# Patient Record
Sex: MALE | Race: WHITE | Employment: OTHER | URBAN - NONMETROPOLITAN AREA
[De-identification: names, ages, dates, MRNs, and addresses within clinical notes are randomized per-mention and may not be internally consistent; named-entity substitution may affect disease eponyms.]

---

## 2017-01-01 ENCOUNTER — APPOINTMENT (OUTPATIENT)
Dept: GENERAL RADIOLOGY | Age: 82
DRG: 853 | End: 2017-01-01
Attending: SURGERY
Payer: MEDICARE

## 2017-01-01 ENCOUNTER — ANESTHESIA (OUTPATIENT)
Dept: OPERATING ROOM | Age: 82
DRG: 853 | End: 2017-01-01
Payer: MEDICARE

## 2017-01-01 ENCOUNTER — ANESTHESIA EVENT (OUTPATIENT)
Dept: OPERATING ROOM | Age: 82
DRG: 853 | End: 2017-01-01
Payer: MEDICARE

## 2017-01-01 ENCOUNTER — HOSPITAL ENCOUNTER (INPATIENT)
Age: 82
LOS: 3 days | DRG: 853 | End: 2018-01-01
Attending: SURGERY | Admitting: SURGERY
Payer: MEDICARE

## 2017-01-01 VITALS
TEMPERATURE: 95.9 F | DIASTOLIC BLOOD PRESSURE: 123 MMHG | SYSTOLIC BLOOD PRESSURE: 154 MMHG | RESPIRATION RATE: 15 BRPM | OXYGEN SATURATION: 96 %

## 2017-01-01 LAB
ABO: NORMAL
ALBUMIN SERPL-MCNC: 2 G/DL (ref 3.5–5.1)
ALBUMIN SERPL-MCNC: 2.5 G/DL (ref 3.5–5.1)
ALLEN TEST: ABNORMAL
ALLEN TEST: POSITIVE
ALLEN TEST: POSITIVE
ALP BLD-CCNC: 44 U/L (ref 38–126)
ALP BLD-CCNC: 55 U/L (ref 38–126)
ALT SERPL-CCNC: 37 U/L (ref 11–66)
ALT SERPL-CCNC: 43 U/L (ref 11–66)
AMORPHOUS: ABNORMAL
ANION GAP SERPL CALCULATED.3IONS-SCNC: 14 MEQ/L (ref 8–16)
ANION GAP SERPL CALCULATED.3IONS-SCNC: 15 MEQ/L (ref 8–16)
ANION GAP SERPL CALCULATED.3IONS-SCNC: 15 MEQ/L (ref 8–16)
ANION GAP SERPL CALCULATED.3IONS-SCNC: 16 MEQ/L (ref 8–16)
ANION GAP SERPL CALCULATED.3IONS-SCNC: 17 MEQ/L (ref 8–16)
ANION GAP SERPL CALCULATED.3IONS-SCNC: 19 MEQ/L (ref 8–16)
ANION GAP SERPL CALCULATED.3IONS-SCNC: 21 MEQ/L (ref 8–16)
ANION GAP SERPL CALCULATED.3IONS-SCNC: 23 MEQ/L (ref 8–16)
ANION GAP SERPL CALCULATED.3IONS-SCNC: 25 MEQ/L (ref 8–16)
ANION GAP SERPL CALCULATED.3IONS-SCNC: 27 MEQ/L (ref 8–16)
ANISOCYTOSIS: ABNORMAL
ANTIBODY SCREEN: NORMAL
AST SERPL-CCNC: 76 U/L (ref 5–40)
AST SERPL-CCNC: 90 U/L (ref 5–40)
BACTERIA: ABNORMAL
BANDED NEUTROPHILS ABSOLUTE COUNT: 0.3 THOU/MM3
BANDED NEUTROPHILS ABSOLUTE COUNT: 2.1 THOU/MM3
BANDED NEUTROPHILS ABSOLUTE COUNT: 2.8 THOU/MM3
BANDS PRESENT: 22 %
BANDS PRESENT: 27 %
BANDS PRESENT: 5 %
BASE EXCESS (CALCULATED): -12.1 MMOL/L (ref -2.5–2.5)
BASE EXCESS (CALCULATED): -13.3 MMOL/L (ref -2.5–2.5)
BASE EXCESS (CALCULATED): -14.5 MMOL/L (ref -2.5–2.5)
BASE EXCESS (CALCULATED): -14.5 MMOL/L (ref -2.5–2.5)
BASE EXCESS (CALCULATED): -15.2 MMOL/L (ref -2.5–2.5)
BASE EXCESS (CALCULATED): -5.3 MMOL/L (ref -2.5–2.5)
BASE EXCESS (CALCULATED): -7.5 MMOL/L (ref -2.5–2.5)
BASE EXCESS (CALCULATED): -8.1 MMOL/L (ref -2.5–2.5)
BASOPHILIA: SLIGHT
BASOPHILS # BLD: 0 %
BASOPHILS ABSOLUTE: 0 THOU/MM3 (ref 0–0.1)
BETA-HYDROXYBUTYRATE: 16.31 MG/DL (ref 0.2–2.81)
BILIRUB SERPL-MCNC: 0.3 MG/DL (ref 0.3–1.2)
BILIRUB SERPL-MCNC: 0.3 MG/DL (ref 0.3–1.2)
BILIRUBIN URINE: NEGATIVE
BLOOD, URINE: ABNORMAL
BUN BLDV-MCNC: 34 MG/DL (ref 7–22)
BUN BLDV-MCNC: 36 MG/DL (ref 7–22)
BUN BLDV-MCNC: 37 MG/DL (ref 7–22)
BUN BLDV-MCNC: 38 MG/DL (ref 7–22)
BUN BLDV-MCNC: 40 MG/DL (ref 7–22)
BUN BLDV-MCNC: 40 MG/DL (ref 7–22)
BUN BLDV-MCNC: 42 MG/DL (ref 7–22)
BUN BLDV-MCNC: 43 MG/DL (ref 7–22)
BUN BLDV-MCNC: 45 MG/DL (ref 7–22)
BUN BLDV-MCNC: 45 MG/DL (ref 7–22)
CALCIUM IONIZED SERUM: 1.17 MMOL/L (ref 1.12–1.32)
CALCIUM IONIZED SERUM: 1.21 MMOL/L (ref 1.12–1.32)
CALCIUM SERPL-MCNC: 7 MG/DL (ref 8.5–10.5)
CALCIUM SERPL-MCNC: 7.2 MG/DL (ref 8.5–10.5)
CALCIUM SERPL-MCNC: 7.3 MG/DL (ref 8.5–10.5)
CALCIUM SERPL-MCNC: 7.3 MG/DL (ref 8.5–10.5)
CALCIUM SERPL-MCNC: 7.4 MG/DL (ref 8.5–10.5)
CALCIUM SERPL-MCNC: 7.5 MG/DL (ref 8.5–10.5)
CALCIUM SERPL-MCNC: 7.7 MG/DL (ref 8.5–10.5)
CASTS: ABNORMAL /LPF
CASTS: ABNORMAL /LPF
CHARACTER, URINE: CLEAR
CHLORIDE BLD-SCNC: 91 MEQ/L (ref 98–111)
CHLORIDE BLD-SCNC: 91 MEQ/L (ref 98–111)
CHLORIDE BLD-SCNC: 93 MEQ/L (ref 98–111)
CHLORIDE BLD-SCNC: 94 MEQ/L (ref 98–111)
CHLORIDE BLD-SCNC: 94 MEQ/L (ref 98–111)
CHLORIDE BLD-SCNC: 95 MEQ/L (ref 98–111)
CHLORIDE BLD-SCNC: 95 MEQ/L (ref 98–111)
CHLORIDE BLD-SCNC: 96 MEQ/L (ref 98–111)
CHLORIDE BLD-SCNC: 97 MEQ/L (ref 98–111)
CHLORIDE BLD-SCNC: 97 MEQ/L (ref 98–111)
CO2: 12 MEQ/L (ref 23–33)
CO2: 13 MEQ/L (ref 23–33)
CO2: 13 MEQ/L (ref 23–33)
CO2: 15 MEQ/L (ref 23–33)
CO2: 17 MEQ/L (ref 23–33)
CO2: 18 MEQ/L (ref 23–33)
CO2: 18 MEQ/L (ref 23–33)
CO2: 21 MEQ/L (ref 23–33)
CO2: 22 MEQ/L (ref 23–33)
CO2: 22 MEQ/L (ref 23–33)
COLLECTED BY:: ABNORMAL
COLOR: YELLOW
CREAT SERPL-MCNC: 2.2 MG/DL (ref 0.4–1.2)
CREAT SERPL-MCNC: 2.6 MG/DL (ref 0.4–1.2)
CREAT SERPL-MCNC: 2.8 MG/DL (ref 0.4–1.2)
CREAT SERPL-MCNC: 2.8 MG/DL (ref 0.4–1.2)
CREAT SERPL-MCNC: 3.1 MG/DL (ref 0.4–1.2)
CREAT SERPL-MCNC: 3.1 MG/DL (ref 0.4–1.2)
CREAT SERPL-MCNC: 3.2 MG/DL (ref 0.4–1.2)
CREAT SERPL-MCNC: 3.3 MG/DL (ref 0.4–1.2)
CREAT SERPL-MCNC: 3.4 MG/DL (ref 0.4–1.2)
CREAT SERPL-MCNC: 3.6 MG/DL (ref 0.4–1.2)
CRENATED RBC'S: ABNORMAL
CRYSTALS: ABNORMAL
DEVICE: ABNORMAL
DIFFERENTIAL TYPE: ABNORMAL
DIFFERENTIAL, MANUAL: NORMAL
ELLIPTOCYTES: ABNORMAL
EOSINOPHIL # BLD: 0 %
EOSINOPHIL # BLD: 1 %
EOSINOPHIL # BLD: 2 %
EOSINOPHILS ABSOLUTE: 0 THOU/MM3 (ref 0–0.4)
EOSINOPHILS ABSOLUTE: 0.1 THOU/MM3 (ref 0–0.4)
EOSINOPHILS ABSOLUTE: 0.2 THOU/MM3 (ref 0–0.4)
EPITHELIAL CELLS, UA: ABNORMAL /HPF
GFR SERPL CREATININE-BSD FRML MDRD: 16 ML/MIN/1.73M2
GFR SERPL CREATININE-BSD FRML MDRD: 17 ML/MIN/1.73M2
GFR SERPL CREATININE-BSD FRML MDRD: 18 ML/MIN/1.73M2
GFR SERPL CREATININE-BSD FRML MDRD: 18 ML/MIN/1.73M2
GFR SERPL CREATININE-BSD FRML MDRD: 19 ML/MIN/1.73M2
GFR SERPL CREATININE-BSD FRML MDRD: 19 ML/MIN/1.73M2
GFR SERPL CREATININE-BSD FRML MDRD: 21 ML/MIN/1.73M2
GFR SERPL CREATININE-BSD FRML MDRD: 21 ML/MIN/1.73M2
GFR SERPL CREATININE-BSD FRML MDRD: 23 ML/MIN/1.73M2
GFR SERPL CREATININE-BSD FRML MDRD: 28 ML/MIN/1.73M2
GLUCOSE BLD-MCNC: 110 MG/DL (ref 70–108)
GLUCOSE BLD-MCNC: 125 MG/DL (ref 70–108)
GLUCOSE BLD-MCNC: 126 MG/DL (ref 70–108)
GLUCOSE BLD-MCNC: 178 MG/DL (ref 70–108)
GLUCOSE BLD-MCNC: 211 MG/DL (ref 70–108)
GLUCOSE BLD-MCNC: 323 MG/DL (ref 70–108)
GLUCOSE BLD-MCNC: 429 MG/DL (ref 70–108)
GLUCOSE BLD-MCNC: 478 MG/DL (ref 70–108)
GLUCOSE BLD-MCNC: 521 MG/DL (ref 70–108)
GLUCOSE BLD-MCNC: 78 MG/DL (ref 70–108)
GLUCOSE, URINE: NEGATIVE MG/DL
GLUCOSE, WHOLE BLOOD: 115 MG/DL (ref 70–108)
GLUCOSE, WHOLE BLOOD: 123 MG/DL (ref 70–108)
GLUCOSE, WHOLE BLOOD: 130 MG/DL (ref 70–108)
GLUCOSE, WHOLE BLOOD: 133 MG/DL (ref 70–108)
GLUCOSE, WHOLE BLOOD: 134 MG/DL (ref 70–108)
GLUCOSE, WHOLE BLOOD: 138 MG/DL (ref 70–108)
GLUCOSE, WHOLE BLOOD: 153 MG/DL (ref 70–108)
GLUCOSE, WHOLE BLOOD: 158 MG/DL (ref 70–108)
GLUCOSE, WHOLE BLOOD: 169 MG/DL (ref 70–108)
GLUCOSE, WHOLE BLOOD: 180 MG/DL (ref 70–108)
GLUCOSE, WHOLE BLOOD: 186 MG/DL (ref 70–108)
GLUCOSE, WHOLE BLOOD: 196 MG/DL (ref 70–108)
GLUCOSE, WHOLE BLOOD: 200 MG/DL (ref 70–108)
GLUCOSE, WHOLE BLOOD: 224 MG/DL (ref 70–108)
GLUCOSE, WHOLE BLOOD: 254 MG/DL (ref 70–108)
GLUCOSE, WHOLE BLOOD: 264 MG/DL (ref 70–108)
GLUCOSE, WHOLE BLOOD: 292 MG/DL (ref 70–108)
GLUCOSE, WHOLE BLOOD: 326 MG/DL (ref 70–108)
GLUCOSE, WHOLE BLOOD: 326 MG/DL (ref 70–108)
GLUCOSE, WHOLE BLOOD: 350 MG/DL (ref 70–108)
GLUCOSE, WHOLE BLOOD: 386 MG/DL (ref 70–108)
GLUCOSE, WHOLE BLOOD: 438 MG/DL (ref 70–108)
GLUCOSE, WHOLE BLOOD: 499 MG/DL (ref 70–108)
GLUCOSE, WHOLE BLOOD: 72 MG/DL (ref 70–108)
GLUCOSE, WHOLE BLOOD: 97 MG/DL (ref 70–108)
GLUCOSE, WHOLE BLOOD: 98 MG/DL (ref 70–108)
GRAM STAIN RESULT: ABNORMAL
HCO3: 12 MMOL/L (ref 23–28)
HCO3: 13 MMOL/L (ref 23–28)
HCO3: 13 MMOL/L (ref 23–28)
HCO3: 14 MMOL/L (ref 23–28)
HCO3: 14 MMOL/L (ref 23–28)
HCO3: 16 MMOL/L (ref 23–28)
HCO3: 18 MMOL/L (ref 23–28)
HCO3: 21 MMOL/L (ref 23–28)
HCT VFR BLD CALC: 26.5 % (ref 42–52)
HCT VFR BLD CALC: 27.4 % (ref 42–52)
HCT VFR BLD CALC: 37.1 % (ref 42–52)
HCT VFR BLD CALC: 40.5 % (ref 42–52)
HEMOGLOBIN FINGERSTICK, POC: 10.2 G/DL (ref 14–18)
HEMOGLOBIN: 12 GM/DL (ref 14–18)
HEMOGLOBIN: 13 GM/DL (ref 14–18)
HEMOGLOBIN: 8.5 GM/DL (ref 14–18)
HEMOGLOBIN: 8.8 GM/DL (ref 14–18)
IFIO2: 100
IFIO2: 35
IFIO2: 40
IFIO2: 50
KETONES, URINE: 15
LACTIC ACID: 10.8 MMOL/L (ref 0.5–2.2)
LACTIC ACID: 11.5 MMOL/L (ref 0.5–2.2)
LACTIC ACID: 5.3 MMOL/L (ref 0.5–2.2)
LACTIC ACID: 8 MMOL/L (ref 0.5–2.2)
LACTIC ACID: 9 MMOL/L (ref 0.5–2.2)
LEUKOCYTE EST, POC: NEGATIVE
LV EF: 58 %
LVEF MODALITY: NORMAL
LYMPHOCYTES # BLD: 11 %
LYMPHOCYTES # BLD: 11 %
LYMPHOCYTES # BLD: 22 %
LYMPHOCYTES ABSOLUTE: 0.9 THOU/MM3 (ref 1–4.8)
LYMPHOCYTES ABSOLUTE: 1.2 THOU/MM3 (ref 1–4.8)
LYMPHOCYTES ABSOLUTE: 1.4 THOU/MM3 (ref 1–4.8)
MAGNESIUM: 2.5 MG/DL (ref 1.6–2.4)
MAGNESIUM: 2.5 MG/DL (ref 1.6–2.4)
MAGNESIUM: 2.6 MG/DL (ref 1.6–2.4)
MAGNESIUM: 2.7 MG/DL (ref 1.6–2.4)
MCH RBC QN AUTO: 28.5 PG (ref 27–31)
MCH RBC QN AUTO: 28.5 PG (ref 27–31)
MCH RBC QN AUTO: 28.6 PG (ref 27–31)
MCHC RBC AUTO-ENTMCNC: 32 GM/DL (ref 33–37)
MCHC RBC AUTO-ENTMCNC: 32.2 GM/DL (ref 33–37)
MCHC RBC AUTO-ENTMCNC: 32.3 GM/DL (ref 33–37)
MCV RBC AUTO: 88.3 FL (ref 80–94)
MCV RBC AUTO: 88.7 FL (ref 80–94)
MCV RBC AUTO: 88.9 FL (ref 80–94)
METAMYELOCYTES: 1 %
METAMYELOCYTES: 3 %
METAMYELOCYTES: 4 %
MISCELLANEOUS LAB TEST RESULT: ABNORMAL
MODE: AC
MONOCYTES # BLD: 10 %
MONOCYTES # BLD: 5 %
MONOCYTES # BLD: 8 %
MONOCYTES ABSOLUTE: 0.4 THOU/MM3 (ref 0.4–1.3)
MONOCYTES ABSOLUTE: 0.4 THOU/MM3 (ref 0.4–1.3)
MONOCYTES ABSOLUTE: 1.3 THOU/MM3 (ref 0.4–1.3)
MRSA SCREEN RT-PCR: NEGATIVE
MYELOCYTES: 1 %
MYELOCYTES: 2 %
NITRITE, URINE: NEGATIVE
NUCLEATED RED BLOOD CELLS: 0 /100 WBC
O2 SATURATION: 100 %
O2 SATURATION: 91 %
O2 SATURATION: 92 %
O2 SATURATION: 96 %
O2 SATURATION: 97 %
O2 SATURATION: 99 %
ORGANISM: ABNORMAL
OVALOCYTES: ABNORMAL
PATHOLOGIST REVIEW: ABNORMAL
PCO2: 26 MMHG (ref 35–45)
PCO2: 28 MMHG (ref 35–45)
PCO2: 32 MMHG (ref 35–45)
PCO2: 33 MMHG (ref 35–45)
PCO2: 34 MMHG (ref 35–45)
PCO2: 34 MMHG (ref 35–45)
PCO2: 43 MMHG (ref 35–45)
PCO2: 47 MMHG (ref 35–45)
PDW BLD-RTO: 15.1 % (ref 11.5–14.5)
PDW BLD-RTO: 15.3 % (ref 11.5–14.5)
PDW BLD-RTO: 15.6 % (ref 11.5–14.5)
PH BLOOD GAS: 7.09 (ref 7.35–7.45)
PH BLOOD GAS: 7.19 (ref 7.35–7.45)
PH BLOOD GAS: 7.19 (ref 7.35–7.45)
PH BLOOD GAS: 7.21 (ref 7.35–7.45)
PH BLOOD GAS: 7.29 (ref 7.35–7.45)
PH BLOOD GAS: 7.29 (ref 7.35–7.45)
PH BLOOD GAS: 7.33 (ref 7.35–7.45)
PH BLOOD GAS: 7.39 (ref 7.35–7.45)
PH UA: 5
PHOSPHORUS: 4.2 MG/DL (ref 2.4–4.7)
PHOSPHORUS: 4.2 MG/DL (ref 2.4–4.7)
PHOSPHORUS: 4.4 MG/DL (ref 2.4–4.7)
PHOSPHORUS: 4.4 MG/DL (ref 2.4–4.7)
PHOSPHORUS: 4.5 MG/DL (ref 2.4–4.7)
PHOSPHORUS: 4.5 MG/DL (ref 2.4–4.7)
PHOSPHORUS: 4.6 MG/DL (ref 2.4–4.7)
PHOSPHORUS: 4.8 MG/DL (ref 2.4–4.7)
PHOSPHORUS: 4.8 MG/DL (ref 2.4–4.7)
PLATELET # BLD: 205 THOU/MM3 (ref 130–400)
PLATELET # BLD: 310 THOU/MM3 (ref 130–400)
PLATELET # BLD: 434 THOU/MM3 (ref 130–400)
PLATELET ESTIMATE: ABNORMAL
PLATELET ESTIMATE: ADEQUATE
PLATELET ESTIMATE: ADEQUATE
PMV BLD AUTO: 7.5 MCM (ref 7.4–10.4)
PMV BLD AUTO: 7.7 MCM (ref 7.4–10.4)
PMV BLD AUTO: 7.9 MCM (ref 7.4–10.4)
PO2: 151 MMHG (ref 71–104)
PO2: 495 MMHG (ref 71–104)
PO2: 501 MMHG (ref 71–104)
PO2: 502 MMHG (ref 71–104)
PO2: 67 MMHG (ref 71–104)
PO2: 67 MMHG (ref 71–104)
PO2: 89 MMHG (ref 71–104)
PO2: 91 MMHG (ref 71–104)
POIKILOCYTES: ABNORMAL
POIKILOCYTES: ABNORMAL
POTASSIUM SERPL-SCNC: 4.4 MEQ/L (ref 3.5–5.2)
POTASSIUM SERPL-SCNC: 4.7 MEQ/L (ref 3.5–5.2)
POTASSIUM SERPL-SCNC: 4.7 MEQ/L (ref 3.5–5.2)
POTASSIUM SERPL-SCNC: 4.8 MEQ/L (ref 3.5–5.2)
POTASSIUM SERPL-SCNC: 4.9 MEQ/L (ref 3.5–5.2)
POTASSIUM SERPL-SCNC: 5.2 MEQ/L (ref 3.5–5.2)
POTASSIUM SERPL-SCNC: 5.4 MEQ/L (ref 3.5–5.2)
POTASSIUM SERPL-SCNC: 5.6 MEQ/L (ref 3.5–5.2)
POTASSIUM SERPL-SCNC: 5.6 MEQ/L (ref 3.5–5.2)
POTASSIUM SERPL-SCNC: 6.2 MEQ/L (ref 3.5–5.2)
POTASSIUM, WHOLE BLOOD: 4 MEQ/L (ref 3.5–4.9)
POTASSIUM, WHOLE BLOOD: 4.7 MEQ/L (ref 3.5–4.9)
PROCALCITONIN: 39.61 NG/ML (ref 0.01–0.09)
PROCALCITONIN: > 100 NG/ML (ref 0.01–0.09)
PROTEIN UA: 100 MG/DL
RBC # BLD: 2.98 MILL/MM3 (ref 4.7–6.1)
RBC # BLD: 4.2 MILL/MM3 (ref 4.7–6.1)
RBC # BLD: 4.56 MILL/MM3 (ref 4.7–6.1)
RBC URINE: ABNORMAL /HPF
RENAL EPITHELIAL, UA: ABNORMAL
RESPIRATORY CULTURE: ABNORMAL
RESPIRATORY CULTURE: ABNORMAL
RH FACTOR: NORMAL
SCHISTOCYTES: ABNORMAL
SCHISTOCYTES: ABNORMAL
SEG NEUTROPHILS: 50 %
SEG NEUTROPHILS: 51 %
SEG NEUTROPHILS: 64 %
SEGMENTED NEUTROPHILS ABSOLUTE COUNT: 3.5 THOU/MM3 (ref 1.8–7.7)
SEGMENTED NEUTROPHILS ABSOLUTE COUNT: 3.9 THOU/MM3 (ref 1.8–7.7)
SEGMENTED NEUTROPHILS ABSOLUTE COUNT: 6.5 THOU/MM3 (ref 1.8–7.7)
SET PEEP: 5 MMHG
SET RESPIRATORY RATE: 14 BPM
SET RESPIRATORY RATE: 16 BPM
SET TIDAL VOLUME: 400 ML
SET TIDAL VOLUME: 480 ML
SET TIDAL VOLUME: 550 ML
SODIUM BLD-SCNC: 128 MEQ/L (ref 135–145)
SODIUM BLD-SCNC: 128 MEQ/L (ref 135–145)
SODIUM BLD-SCNC: 129 MEQ/L (ref 135–145)
SODIUM BLD-SCNC: 130 MEQ/L (ref 135–145)
SODIUM BLD-SCNC: 130 MEQ/L (ref 135–145)
SODIUM BLD-SCNC: 131 MEQ/L (ref 135–145)
SODIUM BLD-SCNC: 132 MEQ/L (ref 135–145)
SODIUM BLD-SCNC: 132 MEQ/L (ref 135–145)
SODIUM BLD-SCNC: 133 MEQ/L (ref 135–145)
SODIUM BLD-SCNC: 133 MEQ/L (ref 135–145)
SODIUM, WHOLE BLOOD: 135 MEQ/L (ref 138–146)
SODIUM, WHOLE BLOOD: 139 MEQ/L (ref 138–146)
SOURCE, BLOOD GAS: ABNORMAL
SPECIFIC GRAVITY UA: >= 1.03 (ref 1–1.03)
TOTAL PROTEIN: 4 G/DL (ref 6.1–8)
TOTAL PROTEIN: 4.5 G/DL (ref 6.1–8)
TOXIC GRANULATION: SLIGHT
TROPONIN T: 0.06 NG/ML
TROPONIN T: 0.07 NG/ML
TSH SERPL DL<=0.05 MIU/L-ACNC: 2.04 UIU/ML (ref 0.4–4.2)
UROBILINOGEN, URINE: 0.2 EU/DL
WBC # BLD: 12.7 THOU/MM3 (ref 4.8–10.8)
WBC # BLD: 5.4 THOU/MM3 (ref 4.8–10.8)
WBC # BLD: 7.8 THOU/MM3 (ref 4.8–10.8)
WBC UA: ABNORMAL /HPF
YEAST: ABNORMAL

## 2017-01-01 PROCEDURE — 87040 BLOOD CULTURE FOR BACTERIA: CPT

## 2017-01-01 PROCEDURE — 2580000003 HC RX 258: Performed by: ANESTHESIOLOGY

## 2017-01-01 PROCEDURE — 2580000003 HC RX 258: Performed by: SURGERY

## 2017-01-01 PROCEDURE — 2720000010 HC SURG SUPPLY STERILE: Performed by: SURGERY

## 2017-01-01 PROCEDURE — 88307 TISSUE EXAM BY PATHOLOGIST: CPT

## 2017-01-01 PROCEDURE — 2580000003 HC RX 258: Performed by: PHARMACIST

## 2017-01-01 PROCEDURE — 44146 PARTIAL REMOVAL OF COLON: CPT | Performed by: SURGERY

## 2017-01-01 PROCEDURE — 2500000003 HC RX 250 WO HCPCS: Performed by: INTERNAL MEDICINE

## 2017-01-01 PROCEDURE — 6360000002 HC RX W HCPCS: Performed by: SURGERY

## 2017-01-01 PROCEDURE — 82947 ASSAY GLUCOSE BLOOD QUANT: CPT

## 2017-01-01 PROCEDURE — 94003 VENT MGMT INPAT SUBQ DAY: CPT

## 2017-01-01 PROCEDURE — 81003 URINALYSIS AUTO W/O SCOPE: CPT

## 2017-01-01 PROCEDURE — 36600 WITHDRAWAL OF ARTERIAL BLOOD: CPT

## 2017-01-01 PROCEDURE — S0028 INJECTION, FAMOTIDINE, 20 MG: HCPCS | Performed by: SURGERY

## 2017-01-01 PROCEDURE — 87070 CULTURE OTHR SPECIMN AEROBIC: CPT

## 2017-01-01 PROCEDURE — 3700000000 HC ANESTHESIA ATTENDED CARE: Performed by: SURGERY

## 2017-01-01 PROCEDURE — 80050 GENERAL HEALTH PANEL: CPT

## 2017-01-01 PROCEDURE — 93307 TTE W/O DOPPLER COMPLETE: CPT

## 2017-01-01 PROCEDURE — 6360000002 HC RX W HCPCS: Performed by: INTERNAL MEDICINE

## 2017-01-01 PROCEDURE — P9045 ALBUMIN (HUMAN), 5%, 250 ML: HCPCS | Performed by: INTERNAL MEDICINE

## 2017-01-01 PROCEDURE — 85025 COMPLETE CBC W/AUTO DIFF WBC: CPT

## 2017-01-01 PROCEDURE — 86850 RBC ANTIBODY SCREEN: CPT

## 2017-01-01 PROCEDURE — 2500000003 HC RX 250 WO HCPCS: Performed by: ANESTHESIOLOGY

## 2017-01-01 PROCEDURE — 80048 BASIC METABOLIC PNL TOTAL CA: CPT

## 2017-01-01 PROCEDURE — 99024 POSTOP FOLLOW-UP VISIT: CPT | Performed by: SURGERY

## 2017-01-01 PROCEDURE — 85018 HEMOGLOBIN: CPT

## 2017-01-01 PROCEDURE — 82010 KETONE BODYS QUAN: CPT

## 2017-01-01 PROCEDURE — 86901 BLOOD TYPING SEROLOGIC RH(D): CPT

## 2017-01-01 PROCEDURE — 83735 ASSAY OF MAGNESIUM: CPT

## 2017-01-01 PROCEDURE — 2500000003 HC RX 250 WO HCPCS: Performed by: SURGERY

## 2017-01-01 PROCEDURE — 83605 ASSAY OF LACTIC ACID: CPT

## 2017-01-01 PROCEDURE — 2580000003 HC RX 258: Performed by: INTERNAL MEDICINE

## 2017-01-01 PROCEDURE — A4415 OST SKN BARR W/O CONV >4 SQI: HCPCS | Performed by: SURGERY

## 2017-01-01 PROCEDURE — 82330 ASSAY OF CALCIUM: CPT

## 2017-01-01 PROCEDURE — 6360000002 HC RX W HCPCS: Performed by: ANESTHESIOLOGY

## 2017-01-01 PROCEDURE — 37799 UNLISTED PX VASCULAR SURGERY: CPT

## 2017-01-01 PROCEDURE — 84145 PROCALCITONIN (PCT): CPT

## 2017-01-01 PROCEDURE — 6360000002 HC RX W HCPCS: Performed by: PHARMACIST

## 2017-01-01 PROCEDURE — 0DTN0ZZ RESECTION OF SIGMOID COLON, OPEN APPROACH: ICD-10-PCS | Performed by: SURGERY

## 2017-01-01 PROCEDURE — 6370000000 HC RX 637 (ALT 250 FOR IP): Performed by: INTERNAL MEDICINE

## 2017-01-01 PROCEDURE — A4649 SURGICAL SUPPLIES: HCPCS | Performed by: SURGERY

## 2017-01-01 PROCEDURE — 71010 XR CHEST PORTABLE: CPT

## 2017-01-01 PROCEDURE — 82803 BLOOD GASES ANY COMBINATION: CPT

## 2017-01-01 PROCEDURE — 36415 COLL VENOUS BLD VENIPUNCTURE: CPT

## 2017-01-01 PROCEDURE — C1751 CATH, INF, PER/CENT/MIDLINE: HCPCS | Performed by: SURGERY

## 2017-01-01 PROCEDURE — A6446 CONFORM BAND S W>=3" <5"/YD: HCPCS | Performed by: SURGERY

## 2017-01-01 PROCEDURE — 94002 VENT MGMT INPAT INIT DAY: CPT

## 2017-01-01 PROCEDURE — 36430 TRANSFUSION BLD/BLD COMPNT: CPT

## 2017-01-01 PROCEDURE — A4409 OST SKN BARR CONVEX <=4 SQ I: HCPCS | Performed by: SURGERY

## 2017-01-01 PROCEDURE — 81001 URINALYSIS AUTO W/SCOPE: CPT

## 2017-01-01 PROCEDURE — 2700000000 HC OXYGEN THERAPY PER DAY

## 2017-01-01 PROCEDURE — 3600000004 HC SURGERY LEVEL 4 BASE: Performed by: SURGERY

## 2017-01-01 PROCEDURE — 3600000014 HC SURGERY LEVEL 4 ADDTL 15MIN: Performed by: SURGERY

## 2017-01-01 PROCEDURE — 87641 MR-STAPH DNA AMP PROBE: CPT

## 2017-01-01 PROCEDURE — 51798 US URINE CAPACITY MEASURE: CPT

## 2017-01-01 PROCEDURE — 85014 HEMATOCRIT: CPT

## 2017-01-01 PROCEDURE — 86923 COMPATIBILITY TEST ELECTRIC: CPT

## 2017-01-01 PROCEDURE — 87205 SMEAR GRAM STAIN: CPT

## 2017-01-01 PROCEDURE — A4388 DRAINABLE PCH W EX WEAR BARR: HCPCS | Performed by: SURGERY

## 2017-01-01 PROCEDURE — 0D1M0Z4 BYPASS DESCENDING COLON TO CUTANEOUS, OPEN APPROACH: ICD-10-PCS | Performed by: ORTHOPAEDIC SURGERY

## 2017-01-01 PROCEDURE — 2780000010 HC IMPLANT OTHER: Performed by: SURGERY

## 2017-01-01 PROCEDURE — P9046 ALBUMIN (HUMAN), 25%, 20 ML: HCPCS | Performed by: INTERNAL MEDICINE

## 2017-01-01 PROCEDURE — 2500000003 HC RX 250 WO HCPCS

## 2017-01-01 PROCEDURE — A5063 DRAIN OSTOMY POUCH W/FLANGE: HCPCS | Performed by: SURGERY

## 2017-01-01 PROCEDURE — 2000000000 HC ICU R&B

## 2017-01-01 PROCEDURE — 84484 ASSAY OF TROPONIN QUANT: CPT

## 2017-01-01 PROCEDURE — 84100 ASSAY OF PHOSPHORUS: CPT

## 2017-01-01 PROCEDURE — 84132 ASSAY OF SERUM POTASSIUM: CPT

## 2017-01-01 PROCEDURE — 36592 COLLECT BLOOD FROM PICC: CPT

## 2017-01-01 PROCEDURE — 80053 COMPREHEN METABOLIC PANEL: CPT

## 2017-01-01 PROCEDURE — A4421 OSTOMY SUPPLY MISC: HCPCS | Performed by: SURGERY

## 2017-01-01 PROCEDURE — 84295 ASSAY OF SERUM SODIUM: CPT

## 2017-01-01 PROCEDURE — 75984 XRAY CONTROL CATHETER CHANGE: CPT

## 2017-01-01 PROCEDURE — 06H033Z INSERTION OF INFUSION DEVICE INTO INFERIOR VENA CAVA, PERCUTANEOUS APPROACH: ICD-10-PCS | Performed by: INTERNAL MEDICINE

## 2017-01-01 PROCEDURE — P9016 RBC LEUKOCYTES REDUCED: HCPCS

## 2017-01-01 PROCEDURE — 3700000001 HC ADD 15 MINUTES (ANESTHESIA): Performed by: SURGERY

## 2017-01-01 PROCEDURE — 87081 CULTURE SCREEN ONLY: CPT

## 2017-01-01 PROCEDURE — 74000 XR ABDOMEN LIMITED (KUB): CPT

## 2017-01-01 PROCEDURE — 86900 BLOOD TYPING SEROLOGIC ABO: CPT

## 2017-01-01 PROCEDURE — 87086 URINE CULTURE/COLONY COUNT: CPT

## 2017-01-01 PROCEDURE — 99223 1ST HOSP IP/OBS HIGH 75: CPT | Performed by: SURGERY

## 2017-01-01 DEVICE — RELOAD STPL L75MM OPN H3.8MM CLS 1.5MM WIRE DIA0.2MM REG: Type: IMPLANTABLE DEVICE | Status: FUNCTIONAL

## 2017-01-01 RX ORDER — MORPHINE SULFATE 4 MG/ML
4 INJECTION, SOLUTION INTRAMUSCULAR; INTRAVENOUS
Status: DISCONTINUED | OUTPATIENT
Start: 2017-01-01 | End: 2017-01-01

## 2017-01-01 RX ORDER — ONDANSETRON 2 MG/ML
4 INJECTION INTRAMUSCULAR; INTRAVENOUS EVERY 6 HOURS PRN
Status: DISCONTINUED | OUTPATIENT
Start: 2017-01-01 | End: 2017-01-01

## 2017-01-01 RX ORDER — SODIUM CHLORIDE 9 MG/ML
INJECTION, SOLUTION INTRAVENOUS CONTINUOUS
Status: DISCONTINUED | OUTPATIENT
Start: 2017-01-01 | End: 2017-01-01

## 2017-01-01 RX ORDER — NICOTINE POLACRILEX 4 MG
15 LOZENGE BUCCAL PRN
Status: DISCONTINUED | OUTPATIENT
Start: 2017-01-01 | End: 2018-01-01 | Stop reason: HOSPADM

## 2017-01-01 RX ORDER — ASPIRIN 81 MG/1
81 TABLET, CHEWABLE ORAL DAILY
COMMUNITY

## 2017-01-01 RX ORDER — SODIUM CHLORIDE 9 MG/ML
INJECTION, SOLUTION INTRAVENOUS CONTINUOUS
Status: DISCONTINUED | OUTPATIENT
Start: 2017-01-01 | End: 2018-01-01 | Stop reason: HOSPADM

## 2017-01-01 RX ORDER — EPINEPHRINE 1 MG/ML
INJECTION, SOLUTION, CONCENTRATE INTRAVENOUS PRN
Status: DISCONTINUED | OUTPATIENT
Start: 2017-01-01 | End: 2017-01-01 | Stop reason: SDUPTHER

## 2017-01-01 RX ORDER — TAMSULOSIN HYDROCHLORIDE 0.4 MG/1
0.4 CAPSULE ORAL DAILY
Status: DISCONTINUED | OUTPATIENT
Start: 2017-01-01 | End: 2017-01-01

## 2017-01-01 RX ORDER — LISINOPRIL 20 MG/1
20 TABLET ORAL DAILY
COMMUNITY

## 2017-01-01 RX ORDER — LISINOPRIL 20 MG/1
20 TABLET ORAL DAILY
Status: DISCONTINUED | OUTPATIENT
Start: 2017-01-01 | End: 2017-01-01

## 2017-01-01 RX ORDER — ALBUMIN (HUMAN) 12.5 G/50ML
50 SOLUTION INTRAVENOUS ONCE
Status: DISCONTINUED | OUTPATIENT
Start: 2017-01-01 | End: 2017-01-01 | Stop reason: SDUPTHER

## 2017-01-01 RX ORDER — DEXTROSE MONOHYDRATE 50 MG/ML
100 INJECTION, SOLUTION INTRAVENOUS PRN
Status: DISCONTINUED | OUTPATIENT
Start: 2017-01-01 | End: 2018-01-01 | Stop reason: HOSPADM

## 2017-01-01 RX ORDER — FLUCONAZOLE 2 MG/ML
200 INJECTION, SOLUTION INTRAVENOUS EVERY 24 HOURS
Status: DISCONTINUED | OUTPATIENT
Start: 2017-01-01 | End: 2018-01-01 | Stop reason: HOSPADM

## 2017-01-01 RX ORDER — CITALOPRAM 20 MG/1
20 TABLET ORAL DAILY
COMMUNITY

## 2017-01-01 RX ORDER — SODIUM CHLORIDE, SODIUM LACTATE, POTASSIUM CHLORIDE, CALCIUM CHLORIDE 600; 310; 30; 20 MG/100ML; MG/100ML; MG/100ML; MG/100ML
INJECTION, SOLUTION INTRAVENOUS CONTINUOUS
Status: DISCONTINUED | OUTPATIENT
Start: 2017-01-01 | End: 2017-01-01

## 2017-01-01 RX ORDER — SODIUM CHLORIDE 0.9 % (FLUSH) 0.9 %
10 SYRINGE (ML) INJECTION PRN
Status: DISCONTINUED | OUTPATIENT
Start: 2017-01-01 | End: 2018-01-01 | Stop reason: HOSPADM

## 2017-01-01 RX ORDER — DEXTROSE MONOHYDRATE 25 G/50ML
12.5 INJECTION, SOLUTION INTRAVENOUS PRN
Status: DISCONTINUED | OUTPATIENT
Start: 2017-01-01 | End: 2017-01-01 | Stop reason: SDUPTHER

## 2017-01-01 RX ORDER — DEXTROSE AND SODIUM CHLORIDE 5; .45 G/100ML; G/100ML
INJECTION, SOLUTION INTRAVENOUS CONTINUOUS PRN
Status: DISCONTINUED | OUTPATIENT
Start: 2017-01-01 | End: 2018-01-01 | Stop reason: HOSPADM

## 2017-01-01 RX ORDER — FLUCONAZOLE 2 MG/ML
400 INJECTION, SOLUTION INTRAVENOUS ONCE
Status: COMPLETED | OUTPATIENT
Start: 2017-01-01 | End: 2017-01-01

## 2017-01-01 RX ORDER — AMLODIPINE BESYLATE 10 MG/1
10 TABLET ORAL DAILY
Status: DISCONTINUED | OUTPATIENT
Start: 2017-01-01 | End: 2017-01-01

## 2017-01-01 RX ORDER — POTASSIUM CHLORIDE 7.45 MG/ML
10 INJECTION INTRAVENOUS PRN
Status: DISCONTINUED | OUTPATIENT
Start: 2017-01-01 | End: 2018-01-01 | Stop reason: HOSPADM

## 2017-01-01 RX ORDER — OXYBUTYNIN CHLORIDE 15 MG/1
15 TABLET, EXTENDED RELEASE ORAL DAILY
COMMUNITY

## 2017-01-01 RX ORDER — ATORVASTATIN CALCIUM 40 MG/1
40 TABLET, FILM COATED ORAL NIGHTLY
Status: DISCONTINUED | OUTPATIENT
Start: 2017-01-01 | End: 2017-01-01

## 2017-01-01 RX ORDER — ASPIRIN 81 MG/1
81 TABLET, CHEWABLE ORAL DAILY
Status: DISCONTINUED | OUTPATIENT
Start: 2017-01-01 | End: 2017-01-01

## 2017-01-01 RX ORDER — ONDANSETRON 2 MG/ML
4 INJECTION INTRAMUSCULAR; INTRAVENOUS EVERY 6 HOURS PRN
Status: DISCONTINUED | OUTPATIENT
Start: 2017-01-01 | End: 2018-01-01 | Stop reason: HOSPADM

## 2017-01-01 RX ORDER — ALBUMIN (HUMAN) 12.5 G/50ML
25 SOLUTION INTRAVENOUS
Status: COMPLETED | OUTPATIENT
Start: 2017-01-01 | End: 2017-01-01

## 2017-01-01 RX ORDER — ROCURONIUM BROMIDE 10 MG/ML
INJECTION, SOLUTION INTRAVENOUS PRN
Status: DISCONTINUED | OUTPATIENT
Start: 2017-01-01 | End: 2017-01-01 | Stop reason: SDUPTHER

## 2017-01-01 RX ORDER — SODIUM CHLORIDE 0.9 % (FLUSH) 0.9 %
10 SYRINGE (ML) INJECTION EVERY 12 HOURS SCHEDULED
Status: DISCONTINUED | OUTPATIENT
Start: 2017-01-01 | End: 2017-01-01

## 2017-01-01 RX ORDER — SODIUM CHLORIDE, SODIUM LACTATE, POTASSIUM CHLORIDE, CALCIUM CHLORIDE 600; 310; 30; 20 MG/100ML; MG/100ML; MG/100ML; MG/100ML
INJECTION, SOLUTION INTRAVENOUS ONCE
Status: COMPLETED | OUTPATIENT
Start: 2017-01-01 | End: 2017-01-01

## 2017-01-01 RX ORDER — GABAPENTIN 100 MG/1
100 CAPSULE ORAL 3 TIMES DAILY
COMMUNITY

## 2017-01-01 RX ORDER — CLOPIDOGREL BISULFATE 75 MG/1
75 TABLET ORAL DAILY
COMMUNITY

## 2017-01-01 RX ORDER — MORPHINE SULFATE 2 MG/ML
2 INJECTION, SOLUTION INTRAMUSCULAR; INTRAVENOUS
Status: DISPENSED | OUTPATIENT
Start: 2017-01-01 | End: 2017-01-01

## 2017-01-01 RX ORDER — ACETAMINOPHEN 325 MG/1
650 TABLET ORAL EVERY 4 HOURS PRN
Status: DISCONTINUED | OUTPATIENT
Start: 2017-01-01 | End: 2017-01-01

## 2017-01-01 RX ORDER — 0.9 % SODIUM CHLORIDE 0.9 %
250 INTRAVENOUS SOLUTION INTRAVENOUS ONCE
Status: COMPLETED | OUTPATIENT
Start: 2017-01-01 | End: 2017-01-01

## 2017-01-01 RX ORDER — INSULIN GLARGINE 100 [IU]/ML
40 INJECTION, SOLUTION SUBCUTANEOUS NIGHTLY
COMMUNITY

## 2017-01-01 RX ORDER — PROPOFOL 10 MG/ML
10 INJECTION, EMULSION INTRAVENOUS
Status: DISCONTINUED | OUTPATIENT
Start: 2017-01-01 | End: 2018-01-01 | Stop reason: HOSPADM

## 2017-01-01 RX ORDER — SODIUM CHLORIDE 9 MG/ML
INJECTION, SOLUTION INTRAVENOUS CONTINUOUS PRN
Status: DISCONTINUED | OUTPATIENT
Start: 2017-01-01 | End: 2017-01-01 | Stop reason: SDUPTHER

## 2017-01-01 RX ORDER — HEPARIN SODIUM 5000 [USP'U]/ML
5000 INJECTION, SOLUTION INTRAVENOUS; SUBCUTANEOUS EVERY 8 HOURS SCHEDULED
Status: DISCONTINUED | OUTPATIENT
Start: 2017-01-01 | End: 2017-01-01

## 2017-01-01 RX ORDER — ACETAMINOPHEN 650 MG/1
650 SUPPOSITORY RECTAL EVERY 4 HOURS PRN
Status: DISCONTINUED | OUTPATIENT
Start: 2017-01-01 | End: 2018-01-01 | Stop reason: HOSPADM

## 2017-01-01 RX ORDER — MORPHINE SULFATE 2 MG/ML
4 INJECTION, SOLUTION INTRAMUSCULAR; INTRAVENOUS
Status: DISPENSED | OUTPATIENT
Start: 2017-01-01 | End: 2017-01-01

## 2017-01-01 RX ORDER — HEPARIN SODIUM 5000 [USP'U]/ML
5000 INJECTION, SOLUTION INTRAVENOUS; SUBCUTANEOUS EVERY 8 HOURS SCHEDULED
Status: DISCONTINUED | OUTPATIENT
Start: 2017-01-01 | End: 2018-01-01 | Stop reason: HOSPADM

## 2017-01-01 RX ORDER — DEXTROSE MONOHYDRATE 25 G/50ML
12.5 INJECTION, SOLUTION INTRAVENOUS PRN
Status: DISCONTINUED | OUTPATIENT
Start: 2017-01-01 | End: 2018-01-01 | Stop reason: HOSPADM

## 2017-01-01 RX ORDER — METOPROLOL SUCCINATE 25 MG/1
25 TABLET, EXTENDED RELEASE ORAL DAILY
Status: DISCONTINUED | OUTPATIENT
Start: 2017-01-01 | End: 2017-01-01

## 2017-01-01 RX ORDER — METOPROLOL SUCCINATE 25 MG/1
25 TABLET, EXTENDED RELEASE ORAL DAILY
COMMUNITY

## 2017-01-01 RX ORDER — MORPHINE SULFATE 2 MG/ML
2 INJECTION, SOLUTION INTRAMUSCULAR; INTRAVENOUS
Status: DISCONTINUED | OUTPATIENT
Start: 2017-01-01 | End: 2017-01-01

## 2017-01-01 RX ORDER — M-VIT,TX,IRON,MINS/CALC/FOLIC 27MG-0.4MG
1 TABLET ORAL DAILY
COMMUNITY

## 2017-01-01 RX ORDER — SODIUM CHLORIDE 0.9 % (FLUSH) 0.9 %
10 SYRINGE (ML) INJECTION EVERY 12 HOURS SCHEDULED
Status: DISCONTINUED | OUTPATIENT
Start: 2017-01-01 | End: 2018-01-01 | Stop reason: HOSPADM

## 2017-01-01 RX ORDER — AMLODIPINE BESYLATE 10 MG/1
10 TABLET ORAL DAILY
COMMUNITY

## 2017-01-01 RX ORDER — DEXTROSE AND SODIUM CHLORIDE 5; .9 G/100ML; G/100ML
INJECTION, SOLUTION INTRAVENOUS CONTINUOUS
Status: DISCONTINUED | OUTPATIENT
Start: 2017-01-01 | End: 2018-01-01 | Stop reason: HOSPADM

## 2017-01-01 RX ORDER — SODIUM CHLORIDE 0.9 % (FLUSH) 0.9 %
10 SYRINGE (ML) INJECTION PRN
Status: DISCONTINUED | OUTPATIENT
Start: 2017-01-01 | End: 2017-01-01

## 2017-01-01 RX ORDER — ALBUMIN, HUMAN INJ 5% 5 %
12.5 SOLUTION INTRAVENOUS
Status: COMPLETED | OUTPATIENT
Start: 2017-01-01 | End: 2017-01-01

## 2017-01-01 RX ORDER — ALBUMIN (HUMAN) 12.5 G/50ML
50 SOLUTION INTRAVENOUS ONCE
Status: DISCONTINUED | OUTPATIENT
Start: 2017-01-01 | End: 2017-01-01

## 2017-01-01 RX ORDER — ATORVASTATIN CALCIUM 40 MG/1
40 TABLET, FILM COATED ORAL NIGHTLY
COMMUNITY

## 2017-01-01 RX ORDER — TAMSULOSIN HYDROCHLORIDE 0.4 MG/1
0.4 CAPSULE ORAL DAILY
COMMUNITY

## 2017-01-01 RX ORDER — FENTANYL CITRATE 50 UG/ML
25 INJECTION, SOLUTION INTRAMUSCULAR; INTRAVENOUS
Status: DISCONTINUED | OUTPATIENT
Start: 2017-01-01 | End: 2018-01-01 | Stop reason: HOSPADM

## 2017-01-01 RX ADMIN — Medication 80 MCG/MIN: at 15:06

## 2017-01-01 RX ADMIN — ALBUMIN (HUMAN) 25 G: 0.25 INJECTION, SOLUTION INTRAVENOUS at 12:55

## 2017-01-01 RX ADMIN — Medication 50 MG: at 22:20

## 2017-01-01 RX ADMIN — SODIUM CHLORIDE, POTASSIUM CHLORIDE, SODIUM LACTATE AND CALCIUM CHLORIDE: 600; 310; 30; 20 INJECTION, SOLUTION INTRAVENOUS at 14:43

## 2017-01-01 RX ADMIN — DEXTROSE AND SODIUM CHLORIDE: 5; 900 INJECTION, SOLUTION INTRAVENOUS at 11:54

## 2017-01-01 RX ADMIN — Medication 6.5 UNITS/HR: at 17:42

## 2017-01-01 RX ADMIN — VASOPRESSIN 0.02 UNITS/MIN: 20 INJECTION INTRAVENOUS at 06:30

## 2017-01-01 RX ADMIN — HYDROCORTISONE SODIUM SUCCINATE 100 MG: 100 INJECTION, POWDER, FOR SOLUTION INTRAMUSCULAR; INTRAVENOUS at 16:16

## 2017-01-01 RX ADMIN — FLUCONAZOLE 400 MG: 2 INJECTION INTRAVENOUS at 16:18

## 2017-01-01 RX ADMIN — PHENYLEPHRINE HYDROCHLORIDE 200 MCG: 10 INJECTION INTRAMUSCULAR; INTRAVENOUS; SUBCUTANEOUS at 21:49

## 2017-01-01 RX ADMIN — ALBUMIN (HUMAN) 12.5 G: 12.5 INJECTION, SOLUTION INTRAVENOUS at 18:18

## 2017-01-01 RX ADMIN — MORPHINE SULFATE 4 MG: 2 INJECTION, SOLUTION INTRAMUSCULAR; INTRAVENOUS at 04:54

## 2017-01-01 RX ADMIN — PHENYLEPHRINE HYDROCHLORIDE 200 MCG: 10 INJECTION INTRAMUSCULAR; INTRAVENOUS; SUBCUTANEOUS at 21:32

## 2017-01-01 RX ADMIN — Medication 12 UNITS: at 13:57

## 2017-01-01 RX ADMIN — EPINEPHRINE 1 MG: 1 INJECTION, SOLUTION, CONCENTRATE INTRAVENOUS at 21:21

## 2017-01-01 RX ADMIN — MEROPENEM 1 G: 1 INJECTION, POWDER, FOR SOLUTION INTRAVENOUS at 21:32

## 2017-01-01 RX ADMIN — EPINEPHRINE 3 MCG/MIN: 1 INJECTION PARENTERAL at 12:02

## 2017-01-01 RX ADMIN — Medication 50 MEQ: at 22:24

## 2017-01-01 RX ADMIN — FAMOTIDINE 20 MG: 10 INJECTION, SOLUTION INTRAVENOUS at 09:49

## 2017-01-01 RX ADMIN — CEFOXITIN 2 G: 2 INJECTION, POWDER, FOR SOLUTION INTRAVENOUS at 21:37

## 2017-01-01 RX ADMIN — Medication 25 MCG/HR: at 12:56

## 2017-01-01 RX ADMIN — Medication 10 ML: at 09:43

## 2017-01-01 RX ADMIN — Medication 10 ML: at 09:50

## 2017-01-01 RX ADMIN — VASOPRESSIN 0.02 UNITS/MIN: 20 INJECTION INTRAVENOUS at 17:50

## 2017-01-01 RX ADMIN — SODIUM CHLORIDE, POTASSIUM CHLORIDE, SODIUM LACTATE AND CALCIUM CHLORIDE: 600; 310; 30; 20 INJECTION, SOLUTION INTRAVENOUS at 10:30

## 2017-01-01 RX ADMIN — Medication 50 MCG/MIN: at 12:39

## 2017-01-01 RX ADMIN — DEXTROSE AND SODIUM CHLORIDE: 5; 450 INJECTION, SOLUTION INTRAVENOUS at 00:12

## 2017-01-01 RX ADMIN — Medication 30 MCG/MIN: at 19:46

## 2017-01-01 RX ADMIN — Medication 50 MEQ: at 22:16

## 2017-01-01 RX ADMIN — FAMOTIDINE 20 MG: 10 INJECTION, SOLUTION INTRAVENOUS at 09:43

## 2017-01-01 RX ADMIN — EPINEPHRINE 2 MCG/MIN: 1 INJECTION PARENTERAL at 13:40

## 2017-01-01 RX ADMIN — SODIUM CHLORIDE: 900 INJECTION INTRAVENOUS at 16:15

## 2017-01-01 RX ADMIN — EPINEPHRINE 1 MG: 1 INJECTION, SOLUTION, CONCENTRATE INTRAVENOUS at 21:26

## 2017-01-01 RX ADMIN — MEROPENEM 1 G: 1 INJECTION, POWDER, FOR SOLUTION INTRAVENOUS at 02:21

## 2017-01-01 RX ADMIN — ALBUMIN (HUMAN) 12.5 G: 12.5 INJECTION, SOLUTION INTRAVENOUS at 17:10

## 2017-01-01 RX ADMIN — ALBUMIN (HUMAN) 12.5 G: 12.5 INJECTION, SOLUTION INTRAVENOUS at 16:15

## 2017-01-01 RX ADMIN — SODIUM CHLORIDE: 9 INJECTION, SOLUTION INTRAVENOUS at 20:35

## 2017-01-01 RX ADMIN — SODIUM BICARBONATE 150 MEQ: 84 INJECTION, SOLUTION INTRAVENOUS at 08:22

## 2017-01-01 RX ADMIN — ALBUMIN (HUMAN) 25 G: 0.25 INJECTION, SOLUTION INTRAVENOUS at 13:31

## 2017-01-01 RX ADMIN — Medication 112.5 MCG/MIN: at 09:53

## 2017-01-01 RX ADMIN — SODIUM CHLORIDE, POTASSIUM CHLORIDE, SODIUM LACTATE AND CALCIUM CHLORIDE: 600; 310; 30; 20 INJECTION, SOLUTION INTRAVENOUS at 16:54

## 2017-01-01 RX ADMIN — MORPHINE SULFATE 4 MG: 2 INJECTION, SOLUTION INTRAMUSCULAR; INTRAVENOUS at 11:39

## 2017-01-01 RX ADMIN — SODIUM CHLORIDE: 900 INJECTION INTRAVENOUS at 09:34

## 2017-01-01 RX ADMIN — SODIUM CHLORIDE: 9 INJECTION, SOLUTION INTRAVENOUS at 21:12

## 2017-01-01 RX ADMIN — MEROPENEM 1 G: 1 INJECTION, POWDER, FOR SOLUTION INTRAVENOUS at 09:37

## 2017-01-01 RX ADMIN — HEPARIN SODIUM 5000 UNITS: 5000 INJECTION, SOLUTION INTRAVENOUS; SUBCUTANEOUS at 14:25

## 2017-01-01 RX ADMIN — HYDROCORTISONE SODIUM SUCCINATE 100 MG: 100 INJECTION, POWDER, FOR SOLUTION INTRAMUSCULAR; INTRAVENOUS at 09:50

## 2017-01-01 RX ADMIN — Medication 50 MCG/HR: at 09:22

## 2017-01-01 RX ADMIN — MORPHINE SULFATE 2 MG: 2 INJECTION, SOLUTION INTRAMUSCULAR; INTRAVENOUS at 09:34

## 2017-01-01 RX ADMIN — Medication 60 MCG/MIN: at 20:14

## 2017-01-01 RX ADMIN — SODIUM BICARBONATE: 84 INJECTION, SOLUTION INTRAVENOUS at 09:34

## 2017-01-01 RX ADMIN — DEXTROSE AND SODIUM CHLORIDE: 5; 900 INJECTION, SOLUTION INTRAVENOUS at 20:22

## 2017-01-01 RX ADMIN — MORPHINE SULFATE 2 MG: 2 INJECTION, SOLUTION INTRAMUSCULAR; INTRAVENOUS at 03:07

## 2017-01-01 RX ADMIN — ALBUMIN (HUMAN) 25 G: 0.25 INJECTION, SOLUTION INTRAVENOUS at 14:24

## 2017-01-01 RX ADMIN — SODIUM CHLORIDE, POTASSIUM CHLORIDE, SODIUM LACTATE AND CALCIUM CHLORIDE: 600; 310; 30; 20 INJECTION, SOLUTION INTRAVENOUS at 11:50

## 2017-01-01 RX ADMIN — Medication 50 MCG/MIN: at 00:38

## 2017-01-01 RX ADMIN — FAMOTIDINE 20 MG: 10 INJECTION, SOLUTION INTRAVENOUS at 20:22

## 2017-01-01 RX ADMIN — Medication 50 MG: at 21:15

## 2017-01-01 RX ADMIN — MEROPENEM 1 G: 1 INJECTION, POWDER, FOR SOLUTION INTRAVENOUS at 10:51

## 2017-01-01 RX ADMIN — MORPHINE SULFATE 2 MG: 2 INJECTION, SOLUTION INTRAMUSCULAR; INTRAVENOUS at 00:42

## 2017-01-01 RX ADMIN — Medication 3 MCG/KG/MIN: at 21:58

## 2017-01-01 RX ADMIN — HYDROCORTISONE SODIUM SUCCINATE 100 MG: 100 INJECTION, POWDER, FOR SOLUTION INTRAMUSCULAR; INTRAVENOUS at 15:07

## 2017-01-01 RX ADMIN — Medication 50 MCG/MIN: at 06:30

## 2017-01-01 RX ADMIN — Medication 150 MEQ: at 08:22

## 2017-01-01 RX ADMIN — Medication 112.5 MCG/MIN: at 12:05

## 2017-01-01 RX ADMIN — SODIUM CHLORIDE 250 ML: 9 INJECTION, SOLUTION INTRAVENOUS at 10:51

## 2017-01-01 RX ADMIN — MEROPENEM 1 G: 1 INJECTION, POWDER, FOR SOLUTION INTRAVENOUS at 20:22

## 2017-01-01 RX ADMIN — FAMOTIDINE 20 MG: 10 INJECTION, SOLUTION INTRAVENOUS at 21:33

## 2017-01-01 RX ADMIN — PHENYLEPHRINE HYDROCHLORIDE 200 MCG: 10 INJECTION INTRAMUSCULAR; INTRAVENOUS; SUBCUTANEOUS at 21:19

## 2017-01-01 RX ADMIN — ENOXAPARIN SODIUM 40 MG: 40 INJECTION SUBCUTANEOUS at 09:45

## 2017-01-01 RX ADMIN — Medication 10 ML: at 21:33

## 2017-01-01 RX ADMIN — FLUCONAZOLE 200 MG: 2 INJECTION, SOLUTION INTRAVENOUS at 13:35

## 2017-01-01 RX ADMIN — HEPARIN SODIUM 5000 UNITS: 5000 INJECTION, SOLUTION INTRAVENOUS; SUBCUTANEOUS at 21:39

## 2017-01-01 RX ADMIN — HEPARIN SODIUM 5000 UNITS: 5000 INJECTION, SOLUTION INTRAVENOUS; SUBCUTANEOUS at 05:44

## 2017-01-01 RX ADMIN — Medication 50 MG: at 23:05

## 2017-01-01 RX ADMIN — ALBUMIN (HUMAN) 12.5 G: 12.5 INJECTION, SOLUTION INTRAVENOUS at 19:25

## 2017-01-01 RX ADMIN — Medication 115 MCG/MIN: at 07:00

## 2017-01-01 RX ADMIN — FLUCONAZOLE 400 MG: 2 INJECTION INTRAVENOUS at 16:15

## 2017-01-01 RX ADMIN — Medication 50 MCG/HR: at 21:39

## 2017-01-01 RX ADMIN — HYDROCORTISONE SODIUM SUCCINATE 100 MG: 100 INJECTION, POWDER, FOR SOLUTION INTRAMUSCULAR; INTRAVENOUS at 00:16

## 2017-01-01 RX ADMIN — ALBUMIN (HUMAN) 25 G: 0.25 INJECTION, SOLUTION INTRAVENOUS at 12:18

## 2017-01-01 RX ADMIN — Medication 1.7 UNITS/HR: at 18:51

## 2017-01-01 RX ADMIN — Medication 50 MCG/HR: at 18:56

## 2017-01-01 RX ADMIN — ACETAMINOPHEN 650 MG: 650 SUPPOSITORY RECTAL at 21:03

## 2017-01-01 RX ADMIN — VANCOMYCIN HYDROCHLORIDE 1250 MG: 5 INJECTION, POWDER, LYOPHILIZED, FOR SOLUTION INTRAVENOUS at 13:56

## 2017-01-01 RX ADMIN — EPINEPHRINE 8 MCG/MIN: 1 INJECTION PARENTERAL at 01:15

## 2017-01-01 RX ADMIN — Medication 8.4 UNITS/HR: at 04:13

## 2017-01-01 RX ADMIN — SODIUM BICARBONATE 100 ML/HR: 84 INJECTION, SOLUTION INTRAVENOUS at 01:40

## 2017-01-01 ASSESSMENT — PULMONARY FUNCTION TESTS
PIF_VALUE: 31
PIF_VALUE: 22
PIF_VALUE: 21
PIF_VALUE: 0
PIF_VALUE: 21
PIF_VALUE: 20
PIF_VALUE: 13
PIF_VALUE: 20
PIF_VALUE: 20
PIF_VALUE: 21
PIF_VALUE: 19
PIF_VALUE: 1
PIF_VALUE: 20
PIF_VALUE: 13
PIF_VALUE: 20
PIF_VALUE: 31
PIF_VALUE: 19
PIF_VALUE: 2
PIF_VALUE: 29
PIF_VALUE: 31
PIF_VALUE: 22
PIF_VALUE: 8
PIF_VALUE: 22
PIF_VALUE: 20
PIF_VALUE: 20
PIF_VALUE: 16
PIF_VALUE: 21
PIF_VALUE: 19
PIF_VALUE: 14
PIF_VALUE: 20
PIF_VALUE: 20
PIF_VALUE: 31
PIF_VALUE: 19
PIF_VALUE: 22
PIF_VALUE: 31
PIF_VALUE: 21
PIF_VALUE: 13
PIF_VALUE: 20
PIF_VALUE: 19
PIF_VALUE: 19
PIF_VALUE: 1
PIF_VALUE: 21
PIF_VALUE: 2
PIF_VALUE: 22
PIF_VALUE: 20
PIF_VALUE: 30
PIF_VALUE: 19
PIF_VALUE: 31
PIF_VALUE: 21
PIF_VALUE: 20
PIF_VALUE: 31
PIF_VALUE: 21
PIF_VALUE: 21
PIF_VALUE: 19
PIF_VALUE: 20
PIF_VALUE: 20
PIF_VALUE: 3
PIF_VALUE: 20
PIF_VALUE: 21
PIF_VALUE: 19
PIF_VALUE: 14
PIF_VALUE: 21
PIF_VALUE: 29
PIF_VALUE: 21
PIF_VALUE: 22
PIF_VALUE: 19
PIF_VALUE: 22
PIF_VALUE: 21
PIF_VALUE: 21
PIF_VALUE: 22
PIF_VALUE: 20
PIF_VALUE: 7
PIF_VALUE: 19
PIF_VALUE: 19
PIF_VALUE: 20
PIF_VALUE: 19
PIF_VALUE: 20
PIF_VALUE: 20
PIF_VALUE: 21
PIF_VALUE: 20
PIF_VALUE: 19
PIF_VALUE: 20
PIF_VALUE: 18
PIF_VALUE: 21
PIF_VALUE: 20
PIF_VALUE: 21
PIF_VALUE: 15
PIF_VALUE: 20
PIF_VALUE: 24
PIF_VALUE: 0
PIF_VALUE: 21
PIF_VALUE: 20
PIF_VALUE: 15
PIF_VALUE: 32
PIF_VALUE: 20
PIF_VALUE: 21
PIF_VALUE: 20
PIF_VALUE: 27
PIF_VALUE: 20
PIF_VALUE: 22
PIF_VALUE: 21
PIF_VALUE: 21
PIF_VALUE: 20
PIF_VALUE: 21
PIF_VALUE: 19
PIF_VALUE: 20
PIF_VALUE: 30
PIF_VALUE: 30
PIF_VALUE: 20
PIF_VALUE: 31
PIF_VALUE: 20
PIF_VALUE: 22
PIF_VALUE: 20
PIF_VALUE: 2
PIF_VALUE: 20
PIF_VALUE: 32
PIF_VALUE: 7
PIF_VALUE: 20
PIF_VALUE: 29
PIF_VALUE: 20
PIF_VALUE: 19
PIF_VALUE: 20
PIF_VALUE: 22
PIF_VALUE: 32
PIF_VALUE: 21
PIF_VALUE: 31
PIF_VALUE: 22
PIF_VALUE: 21
PIF_VALUE: 32
PIF_VALUE: 20
PIF_VALUE: 22
PIF_VALUE: 19
PIF_VALUE: 20

## 2017-01-01 ASSESSMENT — PAIN SCALES - GENERAL
PAINLEVEL_OUTOF10: 8
PAINLEVEL_OUTOF10: 0
PAINLEVEL_OUTOF10: 10
PAINLEVEL_OUTOF10: 9
PAINLEVEL_OUTOF10: 10
PAINLEVEL_OUTOF10: 6
PAINLEVEL_OUTOF10: 6

## 2017-01-01 ASSESSMENT — PAIN DESCRIPTION - PAIN TYPE: TYPE: ACUTE PAIN

## 2017-01-01 ASSESSMENT — PAIN DESCRIPTION - DESCRIPTORS: DESCRIPTORS: SHARP

## 2017-01-01 ASSESSMENT — PAIN DESCRIPTION - FREQUENCY: FREQUENCY: INTERMITTENT

## 2017-01-01 ASSESSMENT — PAIN DESCRIPTION - LOCATION: LOCATION: ABDOMEN

## 2017-01-01 ASSESSMENT — PAIN DESCRIPTION - ORIENTATION: ORIENTATION: RIGHT;UPPER

## 2017-12-29 PROBLEM — K55.9 COLONIC ISCHEMIA (HCC): Status: ACTIVE | Noted: 2017-01-01

## 2017-12-29 PROBLEM — E87.20 METABOLIC ACIDOSIS: Status: ACTIVE | Noted: 2017-01-01

## 2017-12-29 PROBLEM — K56.609 BOWEL OBSTRUCTION (HCC): Status: ACTIVE | Noted: 2017-01-01

## 2017-12-29 PROBLEM — K56.2 SIGMOID VOLVULUS (HCC): Status: ACTIVE | Noted: 2017-01-01

## 2017-12-29 PROBLEM — K63.1 COLON PERFORATION (HCC): Status: ACTIVE | Noted: 2017-01-01

## 2017-12-29 PROBLEM — Z89.612 HX OF AKA (ABOVE KNEE AMPUTATION), LEFT (HCC): Status: ACTIVE | Noted: 2017-01-01

## 2017-12-29 PROBLEM — Z95.1 HX OF CABG: Status: ACTIVE | Noted: 2017-01-01

## 2017-12-29 NOTE — H&P
[facility] [address]  General Surgery History and Physical  Dr. Mercedes Vance Name: Ben Thomson  MRN: [id number]  YOB: 1925  Date of evaluation: 12/29/2017  Primary Care Physician: Hannah Austin  Patient evaluated as a transfer from [facility]  Reason for evaluation: volvulous  IMPRESSIONS:   1. Sigmoid volvulous with probable ischemia  2. Hypoxemia  3. Diabetes  4. Lactic acidosis secondary to ischemia  5. Sepsis  6. Hx CABG  7. Pt wishes to be full code   does not have any pertinent problems on file. PLANS:   1. Admit type: Inpatient  2. It is expected this patient's LOS will be: Greater than 2 midnights  3. Analgesics and antiemetics on a prn basis  4. IV hydration  5. Perioperative antibiotic coverage  6. DVT prophylaxis with SCD's  7. Home Medications as ordered  8. Or for sigmoid colectomy with colostomy  9. Potential diagnostic and therapeutic modalities were discussed with the patient including surgical and nonsurgical options. The risks, complications and benefits of the options were discussed. Complications including, but not limited to, bleeding, wound infection, anastomotic leak, blood clots, bowel obstruction, other organ injury, injury to surrounding structures, intra-abdominal abscess, and need for colostomy were reviewed. The patient was given an opportunity to ask questions. Once answered, the patient is agreeable to proceed with surgery. 10. Further recommendations to follow  SUBJECTIVE:   History of Chief Complaint:    Sofiya Dent is a 80 y.o.male who transferred from [facility] for a sigmoid volvulous. Pt states he has been having problems with constipation over several days, developed severe distention. He complains of abdominal cramping and is currently short of breath normally not on oxygen. He does not offer much else in terms of history.    Past Medical History  In printed chart including BPH, CAD, DM, CKD  Past Surgical History  CABG, left AKA, right groin surgery  Medications  Prior to Admission medications    Medication Sig Start Date End Date Taking? Authorizing Provider   Multiple Vitamins-Minerals (THERAPEUTIC MULTIVITAMIN-MINERALS) tablet Take 1 tablet by mouth daily   Yes Historical Provider, MD   atorvastatin (LIPITOR) 40 MG tablet Take 40 mg by mouth nightly   Yes Historical Provider, MD   metoprolol succinate (TOPROL XL) 25 MG extended release tablet Take 25 mg by mouth daily 2 tabs   Yes Historical Provider, MD   gabapentin (NEURONTIN) 100 MG capsule Take 100 mg by mouth 3 times daily. Yes Historical Provider, MD   citalopram (CELEXA) 20 MG tablet Take 20 mg by mouth daily   Yes Historical Provider, MD   metFORMIN (GLUCOPHAGE) 1000 MG tablet Take 1,000 mg by mouth 2 times daily (with meals)   Yes Historical Provider, MD   lisinopril (PRINIVIL;ZESTRIL) 20 MG tablet Take 20 mg by mouth daily   Yes Historical Provider, MD   clopidogrel (PLAVIX) 75 MG tablet Take 75 mg by mouth daily   Yes Historical Provider, MD   tamsulosin (FLOMAX) 0.4 MG capsule Take 0.4 mg by mouth daily   Yes Historical Provider, MD   oxybutynin (DITROPAN XL) 15 MG extended release tablet Take 15 mg by mouth daily   Yes Historical Provider, MD   insulin glargine (LANTUS) 100 UNIT/ML injection vial Inject 40 Units into the skin nightly   Yes Historical Provider, MD   amLODIPine (NORVASC) 10 MG tablet Take 10 mg by mouth daily   Yes Historical Provider, MD   aspirin 81 MG chewable tablet Take 81 mg by mouth daily   Yes Historical Provider, MD    Scheduled Meds:  Continuous Infusions:  PRN Meds:. Allergies  has No Known Allergies. Family History  family history is not on file.   Social History     Review of Systems:  General Denies any fever or chills  HEENT Denies any diplopia, tinnitus or vertigo  Resp admits to shortness of breath  Cardiac Denies any chest pain, palpitations, claudication or edema  GI Denies any melena, hematochezia, hematemesis or pyrosis   Denies any frequency, urgency, hesitancy or incontinence  Heme Admits to bruising or bleeding easily  Endocrine positive for diabetes  Neuro Denies any focal motor or sensory deficits  OBJECTIVE:   CURRENT VITALS:  height is 6' 1\" (1.854 m) and weight is 195 lb (88.5 kg). His oral temperature is 95.9 °F (35.5 °C). His blood pressure is 92/52 (abnormal) and his pulse is 116. His respiration is 44 (abnormal) and oxygen saturation is 92%. Temperature Range (24h):Temp: 95.9 °F (35.5 °C) Temp  Av.4 °F (36.3 °C)  Min: 95.9 °F (35.5 °C)  Max: 99.1 °F (37.3 °C)  BP Range (00Y): Systolic (33TEH), HWC:875 , Min:57 , WON:287     Diastolic (58SOM), ULS:04, Min:36, Max:123    Pulse Range (24h): Pulse  Av.7  Min: 80  Max: 116  Respiration Range (24h): Resp  Av.2  Min: 7  Max: 66  Current Pulse Ox (24h):  SpO2: 92 %  Pulse Ox Range (24h):  SpO2  Av.5 %  Min: 74 %  Max: 100 %  Oxygen Amount and Delivery: O2 Flow Rate (L/min): 4 L/min  CONSTITUTIONAL: Somewhat confused at times, in acute distress and cooperative to examination. SKIN: Skin color, texture, turgor normal. No rashes or lesions. LYMPH: no cervical nodes, no inguinal nodes  HEENT: Head is normocephalic, atraumatic. EOMI, PERRLA. NECK: Supple, symmetrical, trachea midline, no adenopathy, thyroid symmetric, not enlarged and no tenderness, skin normal.  CHEST/LUNGS: chest symmetric with normal A/P diameter, lungs clear to auscultation, tachypnic without wheezes, rales or rhonchi. No accessory muscle use. Scars Median sternotomy   CARDIOVASCULAR: Heart sounds are normal.  Tachycardic rate and regular rhythm without murmur, gallop or rub. Normal S1 and S2. Carotid and femoral pulses 2+/4 and equal bilaterally. ABDOMEN: severely distended right groin scar(s) present. decreased bowel sounds. No bruits. firm, no masses or organomegaly. no evidence of hernia. Percussion: Normal without hepatosplenomegally.  Tenderness: diffuse  RECTAL: deferred, not clinically

## 2017-12-30 NOTE — FLOWSHEET NOTE
Pt's wife to bedside and friend. Update given on pt status currently critical. Verified phone number we have. Brief history obtained from wife of 2004 CABG at [facility], 2012amputation of left BKA at [facility], HTN and diabetes.

## 2017-12-30 NOTE — ANESTHESIA PRE PROCEDURE
Last Dose    amLODIPine (NORVASC) tablet 10 mg  10 mg Oral Daily Maritzadee November Wisser, DO        aspirin chewable tablet 81 mg  81 mg Oral Daily Maritzadee November Wisser, DO        atorvastatin (LIPITOR) tablet 40 mg  40 mg Oral Nightly Maritzadee November Wisser, DO        tamsulosin (FLOMAX) capsule 0.4 mg  0.4 mg Oral Daily Maritzadee November Wisser, DO        oxybutynin (DITROPAN-XL) extended release tablet 15 mg  15 mg Oral Daily [facility] November Wisser, DO        metoprolol succinate (TOPROL XL) extended release tablet 25 mg  25 mg Oral Daily Lyndee November Wisser, DO        lisinopril (PRINIVIL;ZESTRIL) tablet 20 mg  20 mg Oral Daily Maritzadee November Wisser, DO        0.9 % sodium chloride infusion   Intravenous Continuous Olivia November Wisser,  mL/hr at 12/29/17 2035      sodium chloride flush 0.9 % injection 10 mL  10 mL Intravenous 2 times per day Olivia November Wisser, DO        sodium chloride flush 0.9 % injection 10 mL  10 mL Intravenous PRN Olivia November Wisser, DO        acetaminophen (TYLENOL) tablet 650 mg  650 mg Oral Q4H PRN Renatae November Wisser, DO        morphine injection 2 mg  2 mg Intravenous Q2H PRN Olivia November Wisser, DO        Or    morphine (PF) injection 4 mg  4 mg Intravenous Q2H PRN Maritzadee November Wisser, DO        ondansetron [facility]) injection 4 mg  4 mg Intravenous Q6H PRN Olivia November Wisser, DO        norepinephrine (LEVOPHED) 16 mg in dextrose 5% 250 mL infusion  2 mcg/min Intravenous Continuous Olivia November Wisser, DO         Facility-Administered Medications Ordered in Other Encounters   Medication Dose Route Frequency Provider Last Rate Last Dose    rocuronium (ZEMURON) injection    PRN Randy Bhakta DO   50 mg at 12/29/17 2220    norepinephrine (LEVOPHED) 16 mg in dextrose 5% 250 mL infusion    Continuous PRN Randy Bhakta, DO 83 mL/hr at 12/29/17 2215 1 mcg/kg/min at 12/29/17 2215    EPINEPHrine PF 1 MG/ML injection    PRN Randy Bhakta, DO   1 mg at 12/29/17 2126    phenylephrine (CHARISSE-SYNEPHRINE) injection PRN Flora Mcfarlanditmeyer, DO   200 mcg at 12/29/17 2149    sodium bicarbonate 8.4 % injection    PRN Libradoia Blakem Berhanemeyer, DO   50 mEq at 12/29/17 2224       Allergies:  No Known Allergies    Problem List:    Patient Active Problem List   Diagnosis Code    [facility] K56.2    Bowel obstruction K56.609       Past Medical History:  No past medical history on file. Past Surgical History:  No past surgical history on file. Social History:    Social History   Substance Use Topics    Smoking status: Not on file    Smokeless tobacco: Not on file    Alcohol use Not on file                                Counseling given: Not Answered      Vital Signs (Current):   Vitals:    12/29/17 1835 12/29/17 1959 12/29/17 2045   BP: 130/62 (!) 92/52    Pulse: 80 112 116   Resp: (!) 36 (!) 44    Temp: 96.3 °F (35.7 °C) 95.9 °F (35.5 °C)    TempSrc: Oral Oral    SpO2: 94% 97% 92%   Weight: 195 lb (88.5 kg)     Height: 6' 1\" (1.854 m)                                                BP Readings from Last 3 Encounters:   12/29/17 (!) 92/52   12/29/17 (!) 106/54       NPO Status:                                                                                 BMI:   Wt Readings from Last 3 Encounters:   12/29/17 195 lb (88.5 kg)     Body mass index is 25.73 kg/m². CBC:   Lab Results   Component Value Date    WBC 7.2 09/14/2012    RBC 3.14 09/14/2012    HGB 10.2 12/29/2017    HGB 9.5 09/14/2012    HCT 29.0 09/14/2012    MCV 92.1 09/14/2012    RDW 16.9 09/14/2012     09/14/2012       CMP:   Lab Results   Component Value Date     12/29/2017    K 4.7 12/29/2017       POC Tests: No results for input(s): POCGLU, POCNA, POCK, POCCL, POCBUN, POCHEMO, POCHCT in the last 72 hours.     Coags: No results found for: PROTIME, INR, APTT    HCG (If Applicable): No results found for: PREGTESTUR, PREGSERUM, HCG, HCGQUANT     ABGs: No results found for: PHART, PO2ART, YJT0HLP, PCV1WXO, BEART, H9YCJLHC     Type & Screen (If Applicable):  No results found for: LABABO, [address]    Anesthesia Evaluation    Airway:        Comment: Pt not cooperative. Therefore, unable to assess. Dental:          Pulmonary:   (+) decreased breath sounds,                             Cardiovascular:            Rhythm: regular  Rate: abnormal                    Neuro/Psych:               GI/Hepatic/Renal:             Endo/Other:                     Abdominal:       Abdomen: tender. Bowel sounds: decreased. Vascular:                                        Anesthesia Plan      general     ASA 5 - emergent     (Pt arrived to preop holding without any family or nursing staff via hospital transport personnel. The patient was on 4 liters of oxygen via venturi mask. Pt's abdomen was greatly distended and he was very tachypneic. The OR nursing staff quickly placed the patient on our monitors in the preop holding area while I quickly reviewed the labs from [facility].  It then became very apparent that the patient was very hypoxic (O2 sat of 78%), unresponsive other than some \"moaning\" and hypotensive (58/30 mmHg). Pt taken emergently to OR at this time.)  Induction: intravenous. Anesthetic plan and risks discussed with patient.                       Abdi Russell DO   12/29/2017

## 2017-12-30 NOTE — FLOWSHEET NOTE
The ICU nurse called to ask if I would be present with patient and his wife. His wife is hard of hearing but she did welcomed prayer on behalf of her . Words of comfort given. 12/30/17 1043   Encounter Summary   Services provided to: Patient and family together   Referral/Consult From: Nurse   Support System Spouse   Continue Visiting Yes  (12/30)   Complexity of Encounter High   Length of Encounter 15 minutes   Crisis   Type Emotional distress   Assessment Unable to respond   Intervention Prayer;Nurtured hope; Active listening   Outcome Expressed gratitude;Coping;Encouraged; Hopeful   Spiritual/Moravian   Type Spiritual support   Care Plan:  Continue spiritual and emotional care for patient and family. Including prayers.

## 2017-12-30 NOTE — FLOWSHEET NOTE
Wife states that her and Yossi Maciel have 3 children that they are estranged from and have not had contact with for [de-identified] years. \"There is no need to contact them. It is like they don't even exist.\" Pt's wife has support from friend, Mayo Mohs. Wife states if unable to reach her by telephone to call Mayo Mohs at [phone]. Bryan Montero currently at bedside and agrees with arrangement.

## 2017-12-30 NOTE — PROGRESS NOTES
Pharmacy Note  Vancomycin Consult    Alirio Saeed is a 80 y.o. male started on Vancomycin for sepsis; consult received from Dr. Barry Lopez to manage therapy. Also receiving the following antibiotics: Diflucan, Merrem. Patient Active Problem List   Diagnosis    Sigmoid volvulus ([address]    Bowel obstruction    Colonic ischemia ([address]    Colon perforation ([address]    Metabolic acidosis    Hx of CABG    Hx of AKA (above knee amputation), left (HCC)       Allergies:  Review of patient's allergies indicates no known allergies. Temp max: 98.8    Recent Labs      12/30/17   0410   BUN  34*       Recent Labs      12/30/17   0410   CREATININE  2.2*       Recent Labs      12/30/17   0410   WBC  5.4         Intake/Output Summary (Last 24 hours) at 12/30/17 1136  Last data filed at 12/30/17 0500   Gross per 24 hour   Intake 6384 ml   Output 1195 ml   Net 5189 ml       Culture Date Source Results   12/30/17 BCx2 Sent   12/30/17 UC Sent   12/30/17 Resp Sent         Ht Readings from Last 1 Encounters:   12/30/17 6' 1\" (1.854 m)        Wt Readings from Last 1 Encounters:   12/30/17 194 lb 10.7 oz (88.3 kg)         Body mass index is 25.68 kg/m². Estimated Creatinine Clearance: 24 mL/min (based on SCr of 2.2 mg/dL). Assessment/Plan:  Will initiate vancomycin 1250 mg IV every 36 hours. Timing of trough level will be determined based on culture results, renal function, and clinical response. Thank you for the consult. Will continue to follow.     Brooks Norton, PharmD  12/30/2017 11:39 AM

## 2017-12-30 NOTE — PROGRESS NOTES
Pharmacy Renal Adjustment Consult    Demetrio Leonard is a 80 y.o. male. Pharmacy has been consulted to renally adjust Meropenem. Recent Labs      12/30/17   0410   BUN  34*       Recent Labs      12/30/17   0410   CREATININE  2.2*       Estimated Creatinine Clearance: 24 mL/min (based on SCr of 2.2 mg/dL).     Height:   Ht Readings from Last 1 Encounters:   12/30/17 6' 1\" (1.854 m)     Weight:  Wt Readings from Last 1 Encounters:   12/30/17 194 lb 10.7 oz (88.3 kg)         Plan: Adjust the following medications based on renal function:           Change Meropenem to 1gm IV Q12h    Lynn Cabrera PharmD, BCPS 12/30/2017 11:07 AM

## 2017-12-30 NOTE — FLOWSHEET NOTE
Collected 3 ml of urine and sent to lab for urine culture and UA.  This is the only output from pt this shift

## 2017-12-30 NOTE — PROGRESS NOTES
weight is 194 lb 10.7 oz (88.3 kg). His esophageal temperature is 98.8 °F (37.1 °C). His blood pressure is 53/34 (abnormal) and his pulse is 94. His respiration is 23 and oxygen saturation is 97%.    Temperature Range (24h):Temp: 98.8 °F (37.1 °C) Temp  Av.4 °F (36.3 °C)  Min: 95.9 °F (35.5 °C)  Max: 99.1 °F (37.3 °C)  BP Range (60X): Systolic (92HJR), FZC:080 , Min:53 , CTW:546     Diastolic (92OFZ), VBA:36, Min:34, Max:123    Pulse Range (24h): Pulse  Av.9  Min: 80  Max: 122  Respiration Range (24h): Resp  Av.5  Min: 7  Max: 66  Current Pulse Ox (24h):  SpO2: 97 %  Pulse Ox Range (24h):  SpO2  Av.5 %  Min: 74 %  Max: 100 %  Oxygen Amount and Delivery: O2 Flow Rate (L/min): 4 L/min  VENT INFORMATION:  Lab Results   Component Value Date    MODE AC 2017    SETTIDVOL 550 2017    SETPEEP 5.0 2017     ABG:   Lab Results   Component Value Date    PH 7.39 2017    PCO2 26 2017    PO2 89 2017    HCO3 16 2017    O2SAT 97 2017     ICP No Data Recorded CVP No Data Recorded  GENERAL: no distress, arousable  LUNGS: clear to ausculation, without wheezes, rales or rhonci  HEART: normal rate and regular rhythm  ABDOMEN: non-distended, bowel sounds present but decreased in all 4 quadrants, no guarding or peritoneal signs present and soft  INCISION: Open, fascia intact  DRAINS; cloudy fluid  EXTERMITY: no cyanosis, clubbing or edema LLE AKA  In: 6384 [I.V.:6384]  Out: 1195 [Urine:750; Drains:370]  Closed/Suction Drain Abdomen Bulb 19 Barb[place]-Output (ml): 60 ml  Closed/Suction Drain Right Abdomen Bulb 19 Barb[place]-Output (ml): 50 ml    Date 17 0000 - 17 1619   Shift 3544-2827 [id number] 1546-3350 24 Hour Total   I  N  T  A  K  E   I.V.  (mL/kg) 1684  (19.1)   1684  (19.1)    Shift Total  (mL/kg) 1684  (19.1)   1684  (19.1)   O  U  T  P  U  T   Urine  (mL/kg/hr) 0  (0)   0    Drains  (mL/kg) 110  (1.2)   110  (1.2)    Stool  (mL/kg) 0  (0)   0  (0)    Shift

## 2017-12-30 NOTE — FLOWSHEET NOTE
5211 Attempt to call number listed on face sheet from [facility]. [phone]. No answer and automated recording stated \"no voice mail set up\" so not able to leave message. [address] [facility] 181 notified unable to reach family.

## 2017-12-31 PROBLEM — R65.20 SEVERE SEPSIS (HCC): Status: ACTIVE | Noted: 2017-01-01

## 2017-12-31 PROBLEM — A41.9 SEVERE SEPSIS (HCC): Status: ACTIVE | Noted: 2017-01-01

## 2017-12-31 NOTE — PLAN OF CARE
Problem: Restraint Use - Nonviolent/Non-Self-Destructive Behavior:  Goal: Absence of restraint indications  Absence of restraint indications   Outcome: Ongoing  For pt safety  Goal: Absence of restraint-related injury  Absence of restraint-related injury   Outcome: Ongoing  No evidence of injury at this time. Comments: Care plan reviewed family. Family verbalize understanding of the plan of care and contribute to goal setting.

## 2017-12-31 NOTE — PROGRESS NOTES
Jaspal Ny  Postoperative Progress Note  Pt Name: [address] [place] [address] Record Number: [id number]  Date of Birth 10/12/1925   Today's Date: 12/31/2017  Chief Complaint: sedated on vent  ASSESSMENT   1. Hospital day # 2  2. POD #2 sigmoid colectomy, diverting colostomy for sigmoid volvulus with ischemia, perforation and gross stool contamination (high risk for abscess formation)  3. Sepsis due to #2  4. Metabolic/Lactic acidosis secondary to #2, base excess improving  5. Hx CABG with increased troponin S/P CPR  6. Hyperglycemia with history diabetes, better controlled  7. Worsening renal function  8. Open abdominal wound, fascia closed   has a past medical history of Arthritis; CAD (coronary artery disease); Diabetes mellitus ([facility] Hyperlipidemia; Hypertension; Prostate CA ([facility] and Skin cancer. PLAN   1. Pressors as needed  2. IV fluids  3. Electrolyte replacement protocols  4. IV antibiotic coverage  5. Lovenox for DVT prophylaxis  6. Ng to LIS  7. SERAFIN drains to bulb suction  8. Ostomy care  9. Twice daily wet-to-dry dressing changes  10. Analgesics and antiemetics as needed  11. Daily labs  12. Diabetic management  13. Monitor renal function, may continue to worsen  14. Have been unable to contact family  Kehinde Diaz is sedated on the vent. Still on a large amount of vasopressors. Arterial line currently not functioning and will be removed.    CURRENT MEDICATIONS   Scheduled Meds:   sodium chloride  250 mL Intravenous Once    sodium chloride flush  10 mL Intravenous 2 times per day    famotidine (PEPCID) injection  20 mg Intravenous BID    meropenem  1 g Intravenous Q12H    vancomycin  1,250 mg Intravenous Q36H    vancomycin (VANCOCIN) intermittent dosing (placeholder)   Other RX Placeholder    heparin (porcine)  5,000 Units Subcutaneous 3 times per day    fluconazole  200 mg Intravenous Q24H    hydrocortisone sodium succinate PF  100 mg Intravenous Q8H     Continuous Infusions:   norepinephrine 50 mcg/min (17 0630)    propofol      sodium bicarbonate infusion Stopped (17 1600)    EPINEPHrine infusion 8 mcg/min (17 0430)    fentaNYL (SUBLIMAZE) 500 mcg in sodium chloride 0.9 % 100 mL 50 mcg/hr (17 0922)    vasopressin infusion 0.02 Units/min (17 0630)    dextrose      insulin (HUMAN R) non-weight based infusion 5.2 Units/hr (17 0928)    sodium chloride Stopped (17 0005)    dextrose 5 % and 0.45 % NaCl 150 mL/hr at 17 0012     PRN Meds:.sodium chloride flush, ondansetron, fentanNYL, glucose, glucagon (rDNA), dextrose, insulin regular, dextrose, potassium chloride, magnesium sulfate, sodium phosphate IVPB **OR** sodium phosphate IVPB **OR** sodium phosphate IVPB, dextrose 5 % and 0.45 % NaCl  OBJECTIVE   CURRENT VITALS:  height is 6' 1\" (1.854 m) and weight is 194 lb 10.7 oz (88.3 kg). His esophageal temperature is 100 °F (37.8 °C). His blood pressure is 133/58 (abnormal) and his pulse is 94. His respiration is 16 and oxygen saturation is 93%.    Temperature Range (24h):Temp: 100 °F (37.8 °C) Temp  Av.8 °F (38.2 °C)  Min: 99.9 °F (37.7 °C)  Max: 102 °F (38.9 °C)  BP Range (97Q): Systolic (89WYK), KNN:151 , Min:95 , VALERIE:997     Diastolic (62TFL), EIR:42, Min:43, Max:58    Pulse Range (24h): Pulse  Av.6  Min: 90  Max: 130  Respiration Range (24h): Resp  Av.9  Min: 16  Max: 26  Current Pulse Ox (24h):  SpO2: 93 %  Pulse Ox Range (24h):  SpO2  Av.3 %  Min: 93 %  Max: 100 %  Oxygen Amount and Delivery: O2 Flow Rate (L/min): 4 L/min  VENT INFORMATION:  Lab Results   Component Value Date    MODE AC 2017    SETTIDVOL 480 2017    SETPEEP 5.0 2017     ABG:   Lab Results   Component Value Date    PH 7.29 2017    PCO2 43 2017    PO2 67 2017    HCO3 21 2017    O2SAT 91 2017     ICP No Data Recorded CVP No Data Recorded  GENERAL: no

## 2017-12-31 NOTE — FLOWSHEET NOTE
8230 Dr Casper Thacker notified of pt blood culture Aerobic gram positive bacilli. Also informed of pt Afib. Orders received.

## 2017-12-31 NOTE — PLAN OF CARE
Problem: Impaired respiratory status  Goal: Will be able to breathe spontaneously, without ventilator support  Will be able to breathe spontaneously, without ventilator support    Outcome: Ongoing        VENTILATOR LIBERATION PROTOCOL    PRE-TRIAL PATIENT ASSESSMENT - COMPLETED AT 1130    Ventilatory Assessment:    PARAMETER CRITERIA FOR WEANING   Reversal  of the acute disease process that prompted intubation: No At least partial or complete reversal   FiO2 : 35 % FIO2 less than or equal to 50%     PEEP less than or equal to 8 cm H2O   Hemodynamic stability: No Dopamine or Dobutamine at 5 mcg/kg/minute or less   Adequate correction of electrolytes, Hgb, and HCT: No Within lab range   Neurologic stability: No Ability to cough, voluntarily initiate ventilator effort, cooperate with pulmonary toilet measures     ABG:   Lab Results   Component Value Date    PH 7.29 12/31/2017    PO2 67 12/31/2017    PCO2 43 12/31/2017    HCO3 21 12/31/2017    O2SAT 91 12/31/2017     HGB/WBC:  Lab Results   Component Value Date    HGB 8.5 (L) 12/31/2017    WBC 12.7 (H) 12/31/2017         Vital Signs:    PARAMETER CRITERIA FOR WEANING Meets Criteria   Pulse: 97 Within patient's normal limits / stable Yes   Resp: 18 Less than 35 No   BP: 115/61 Within patient's normal limits / minimal pressors (Hemodynamically Stable) No   SpO2: 93 % Greater than or equal to 90% Yes   Temp: 100.2 °F (37.9 °C) Less than 38. 5oC / 101. 3oF Yes    Sedation/paralytic weaned No     []    Based on this assessment and the Ventilator Liberation Protocol, this patient IS being placed on a Spontaneous Breathing Trial (SBT) at this time. [x]    Based on this assessment and the Ventilator Liberation Protocol, this patient IS NOT being placed on a Spontaneous Breathing Trial (SBT) at this time      Continue with ventilator to maintain adequate respiratory status.

## 2018-01-01 ENCOUNTER — APPOINTMENT (OUTPATIENT)
Dept: GENERAL RADIOLOGY | Age: 83
DRG: 853 | End: 2018-01-01
Attending: SURGERY
Payer: MEDICARE

## 2018-01-01 VITALS
WEIGHT: 194.67 LBS | SYSTOLIC BLOOD PRESSURE: 62 MMHG | HEART RATE: 67 BPM | BODY MASS INDEX: 25.8 KG/M2 | TEMPERATURE: 99.9 F | DIASTOLIC BLOOD PRESSURE: 41 MMHG | HEIGHT: 73 IN | RESPIRATION RATE: 17 BRPM | OXYGEN SATURATION: 81 %

## 2018-01-01 LAB
ALLEN TEST: ABNORMAL
ALLEN TEST: POSITIVE
ALLEN TEST: POSITIVE
ANION GAP SERPL CALCULATED.3IONS-SCNC: 15 MEQ/L (ref 8–16)
ANION GAP SERPL CALCULATED.3IONS-SCNC: 17 MEQ/L (ref 8–16)
ANION GAP SERPL CALCULATED.3IONS-SCNC: 19 MEQ/L (ref 8–16)
ANISOCYTOSIS: ABNORMAL
BANDED NEUTROPHILS ABSOLUTE COUNT: 2.2 THOU/MM3
BANDS PRESENT: 11 %
BASE EXCESS (CALCULATED): -1.2 MMOL/L (ref -2.5–2.5)
BASE EXCESS (CALCULATED): -6.9 MMOL/L (ref -2.5–2.5)
BASE EXCESS (CALCULATED): -8.4 MMOL/L (ref -2.5–2.5)
BASE EXCESS (CALCULATED): -8.9 MMOL/L (ref -2.5–2.5)
BASE EXCESS (CALCULATED): 12.2 MMOL/L (ref -2.5–2.5)
BASE EXCESS (CALCULATED): 2 MMOL/L (ref -2.5–2.5)
BASOPHILS # BLD: 0 %
BASOPHILS ABSOLUTE: 0 THOU/MM3 (ref 0–0.1)
BLOOD CULTURE, ROUTINE: ABNORMAL
BLOOD CULTURE, ROUTINE: ABNORMAL
BUN BLDV-MCNC: 46 MG/DL (ref 7–22)
BUN BLDV-MCNC: 47 MG/DL (ref 7–22)
BUN BLDV-MCNC: 48 MG/DL (ref 7–22)
CALCIUM IONIZED SERUM: 1.07 MMOL/L (ref 1.12–1.32)
CALCIUM IONIZED: 1.04 MMOL/L (ref 1.12–1.32)
CALCIUM SERPL-MCNC: 6.9 MG/DL (ref 8.5–10.5)
CALCIUM SERPL-MCNC: 7.1 MG/DL (ref 8.5–10.5)
CALCIUM SERPL-MCNC: 7.8 MG/DL (ref 8.5–10.5)
CHLORIDE BLD-SCNC: 90 MEQ/L (ref 98–111)
CHLORIDE BLD-SCNC: 92 MEQ/L (ref 98–111)
CHLORIDE BLD-SCNC: 94 MEQ/L (ref 98–111)
CO2: 18 MEQ/L (ref 23–33)
CO2: 20 MEQ/L (ref 23–33)
CO2: 20 MEQ/L (ref 23–33)
COLLECTED BY:: ABNORMAL
CREAT SERPL-MCNC: 3.7 MG/DL (ref 0.4–1.2)
CREAT SERPL-MCNC: 3.9 MG/DL (ref 0.4–1.2)
CREAT SERPL-MCNC: 4.1 MG/DL (ref 0.4–1.2)
CRENATED RBC'S: ABNORMAL
DEVICE: ABNORMAL
DIFFERENTIAL, MANUAL: NORMAL
EKG ATRIAL RATE: 115 BPM
EKG Q-T INTERVAL: 312 MS
EKG QRS DURATION: 130 MS
EKG QTC CALCULATION (BAZETT): 422 MS
EKG R AXIS: 36 DEGREES
EKG T AXIS: -11 DEGREES
EKG VENTRICULAR RATE: 110 BPM
EOSINOPHIL # BLD: 0 %
EOSINOPHILS ABSOLUTE: 0 THOU/MM3 (ref 0–0.4)
GFR SERPL CREATININE-BSD FRML MDRD: 14 ML/MIN/1.73M2
GFR SERPL CREATININE-BSD FRML MDRD: 15 ML/MIN/1.73M2
GFR SERPL CREATININE-BSD FRML MDRD: 15 ML/MIN/1.73M2
GLUCOSE BLD-MCNC: 103 MG/DL (ref 70–108)
GLUCOSE BLD-MCNC: 169 MG/DL (ref 70–108)
GLUCOSE BLD-MCNC: 298 MG/DL (ref 70–108)
GLUCOSE, WHOLE BLOOD: 106 MG/DL (ref 70–108)
GLUCOSE, WHOLE BLOOD: 126 MG/DL (ref 70–108)
GLUCOSE, WHOLE BLOOD: 155 MG/DL (ref 70–108)
GLUCOSE, WHOLE BLOOD: 156 MG/DL (ref 70–108)
GLUCOSE, WHOLE BLOOD: 165 MG/DL (ref 70–108)
GLUCOSE, WHOLE BLOOD: 200 MG/DL (ref 70–108)
GLUCOSE, WHOLE BLOOD: 201 MG/DL (ref 70–108)
GLUCOSE, WHOLE BLOOD: 224 MG/DL (ref 70–108)
GLUCOSE, WHOLE BLOOD: 253 MG/DL (ref 70–108)
GLUCOSE, WHOLE BLOOD: 96 MG/DL (ref 70–108)
HCO3: 17 MMOL/L (ref 23–28)
HCO3: 19 MMOL/L (ref 23–28)
HCO3: 20 MMOL/L (ref 23–28)
HCO3: 28 MMOL/L (ref 23–28)
HCO3: 28 MMOL/L (ref 23–28)
HCO3: 40 MMOL/L (ref 23–28)
HCT VFR BLD CALC: 26.3 % (ref 42–52)
HEMOGLOBIN: 8.6 GM/DL (ref 14–18)
IFIO2: 100
IFIO2: 90
LACTIC ACID: 4.8 MMOL/L (ref 0.5–2.2)
LACTIC ACID: 5.2 MMOL/L (ref 0.5–2.2)
LV EF: 45 %
LVEF MODALITY: NORMAL
LYMPHOCYTES # BLD: 2 %
LYMPHOCYTES ABSOLUTE: 0.4 THOU/MM3 (ref 1–4.8)
MAGNESIUM: 2.6 MG/DL (ref 1.6–2.4)
MAGNESIUM: 2.7 MG/DL (ref 1.6–2.4)
MAGNESIUM: 2.7 MG/DL (ref 1.6–2.4)
MCH RBC QN AUTO: 28.9 PG (ref 27–31)
MCHC RBC AUTO-ENTMCNC: 32.7 GM/DL (ref 33–37)
MCV RBC AUTO: 88.6 FL (ref 80–94)
METAMYELOCYTES: 6 %
MODE: AC
MONOCYTES # BLD: 6 %
MONOCYTES ABSOLUTE: 1.2 THOU/MM3 (ref 0.4–1.3)
MRSA SCREEN: NORMAL
MYELOCYTES: 2 %
NUCLEATED RED BLOOD CELLS: 0 /100 WBC
O2 SATURATION: 30 %
O2 SATURATION: 57 %
O2 SATURATION: 76 %
O2 SATURATION: 81 %
O2 SATURATION: 94 %
O2 SATURATION: 95 %
ORGANISM: ABNORMAL
OVALOCYTES: ABNORMAL
PCO2: 34 MMHG (ref 35–45)
PCO2: 45 MMHG (ref 35–45)
PCO2: 48 MMHG (ref 35–45)
PCO2: 54 MMHG (ref 35–45)
PCO2: 74 MMHG (ref 35–45)
PCO2: 77 MMHG (ref 35–45)
PDW BLD-RTO: 16.1 % (ref 11.5–14.5)
PH BLOOD GAS: 7.17 (ref 7.35–7.45)
PH BLOOD GAS: 7.21 (ref 7.35–7.45)
PH BLOOD GAS: 7.25 (ref 7.35–7.45)
PH BLOOD GAS: 7.3 (ref 7.35–7.45)
PH BLOOD GAS: 7.33 (ref 7.35–7.45)
PH BLOOD GAS: 7.33 (ref 7.35–7.45)
PHOSPHORUS: 4.8 MG/DL (ref 2.4–4.7)
PHOSPHORUS: 5.2 MG/DL (ref 2.4–4.7)
PHOSPHORUS: 6.2 MG/DL (ref 2.4–4.7)
PLATELET # BLD: 131 THOU/MM3 (ref 130–400)
PLATELET ESTIMATE: ADEQUATE
PMV BLD AUTO: 7.9 MCM (ref 7.4–10.4)
PO2: 23 MMHG (ref 71–104)
PO2: 39 MMHG (ref 71–104)
PO2: 46 MMHG (ref 71–104)
PO2: 50 MMHG (ref 71–104)
PO2: 78 MMHG (ref 71–104)
PO2: 89 MMHG (ref 71–104)
POIKILOCYTES: ABNORMAL
POTASSIUM SERPL-SCNC: 6 MEQ/L (ref 3.5–5.2)
POTASSIUM SERPL-SCNC: 6.1 MEQ/L (ref 3.5–5.2)
POTASSIUM SERPL-SCNC: 6.4 MEQ/L (ref 3.5–5.2)
POTASSIUM, WHOLE BLOOD: 5.7 MEQ/L (ref 3.5–4.9)
PROCALCITONIN: > 100 NG/ML (ref 0.01–0.09)
RBC # BLD: 2.97 MILL/MM3 (ref 4.7–6.1)
SEG NEUTROPHILS: 73 %
SEGMENTED NEUTROPHILS ABSOLUTE COUNT: 14.3 THOU/MM3 (ref 1.8–7.7)
SET PEEP: 10 MMHG
SET PEEP: 12 MMHG
SET PEEP: 5 MMHG
SET PEEP: 5 MMHG
SET RESPIRATORY RATE: 14 BPM
SET RESPIRATORY RATE: 14 BPM
SET RESPIRATORY RATE: 30 BPM
SET RESPIRATORY RATE: 36 BPM
SET TIDAL VOLUME: 400 ML
SET TIDAL VOLUME: 480 ML
SODIUM BLD-SCNC: 127 MEQ/L (ref 135–145)
SODIUM BLD-SCNC: 129 MEQ/L (ref 135–145)
SODIUM BLD-SCNC: 129 MEQ/L (ref 135–145)
SODIUM, WHOLE BLOOD: 128 MEQ/L (ref 138–146)
SOURCE, BLOOD GAS: ABNORMAL
TOXIC GRANULATION: ABNORMAL
TROPONIN T: 0.14 NG/ML
URINE CULTURE, ROUTINE: NORMAL
WBC # BLD: 19.6 THOU/MM3 (ref 4.8–10.8)

## 2018-01-01 PROCEDURE — 6360000002 HC RX W HCPCS

## 2018-01-01 PROCEDURE — 99024 POSTOP FOLLOW-UP VISIT: CPT | Performed by: SURGERY

## 2018-01-01 PROCEDURE — 2720000010 HC SURG SUPPLY STERILE

## 2018-01-01 PROCEDURE — 83605 ASSAY OF LACTIC ACID: CPT

## 2018-01-01 PROCEDURE — 84132 ASSAY OF SERUM POTASSIUM: CPT

## 2018-01-01 PROCEDURE — 5A1D70Z PERFORMANCE OF URINARY FILTRATION, INTERMITTENT, LESS THAN 6 HOURS PER DAY: ICD-10-PCS | Performed by: INTERNAL MEDICINE

## 2018-01-01 PROCEDURE — 2500000003 HC RX 250 WO HCPCS: Performed by: INTERNAL MEDICINE

## 2018-01-01 PROCEDURE — 6360000002 HC RX W HCPCS: Performed by: INTERNAL MEDICINE

## 2018-01-01 PROCEDURE — 82947 ASSAY GLUCOSE BLOOD QUANT: CPT

## 2018-01-01 PROCEDURE — 80048 BASIC METABOLIC PNL TOTAL CA: CPT

## 2018-01-01 PROCEDURE — 84145 PROCALCITONIN (PCT): CPT

## 2018-01-01 PROCEDURE — 36415 COLL VENOUS BLD VENIPUNCTURE: CPT

## 2018-01-01 PROCEDURE — 83735 ASSAY OF MAGNESIUM: CPT

## 2018-01-01 PROCEDURE — 2700000000 HC OXYGEN THERAPY PER DAY

## 2018-01-01 PROCEDURE — 85025 COMPLETE CBC W/AUTO DIFF WBC: CPT

## 2018-01-01 PROCEDURE — 82330 ASSAY OF CALCIUM: CPT

## 2018-01-01 PROCEDURE — B543ZZA ULTRASONOGRAPHY OF RIGHT JUGULAR VEINS, GUIDANCE: ICD-10-PCS | Performed by: INTERNAL MEDICINE

## 2018-01-01 PROCEDURE — 71045 X-RAY EXAM CHEST 1 VIEW: CPT

## 2018-01-01 PROCEDURE — 2580000003 HC RX 258

## 2018-01-01 PROCEDURE — 84295 ASSAY OF SERUM SODIUM: CPT

## 2018-01-01 PROCEDURE — 36592 COLLECT BLOOD FROM PICC: CPT

## 2018-01-01 PROCEDURE — 84100 ASSAY OF PHOSPHORUS: CPT

## 2018-01-01 PROCEDURE — 05HM33Z INSERTION OF INFUSION DEVICE INTO RIGHT INTERNAL JUGULAR VEIN, PERCUTANEOUS APPROACH: ICD-10-PCS | Performed by: INTERNAL MEDICINE

## 2018-01-01 PROCEDURE — 93307 TTE W/O DOPPLER COMPLETE: CPT

## 2018-01-01 PROCEDURE — 36556 INSERT NON-TUNNEL CV CATH: CPT

## 2018-01-01 PROCEDURE — 2500000003 HC RX 250 WO HCPCS

## 2018-01-01 PROCEDURE — 6370000000 HC RX 637 (ALT 250 FOR IP)

## 2018-01-01 PROCEDURE — 36600 WITHDRAWAL OF ARTERIAL BLOOD: CPT

## 2018-01-01 PROCEDURE — 93005 ELECTROCARDIOGRAM TRACING: CPT

## 2018-01-01 PROCEDURE — 99291 CRITICAL CARE FIRST HOUR: CPT | Performed by: INTERNAL MEDICINE

## 2018-01-01 PROCEDURE — 84484 ASSAY OF TROPONIN QUANT: CPT

## 2018-01-01 PROCEDURE — 99223 1ST HOSP IP/OBS HIGH 75: CPT | Performed by: INTERNAL MEDICINE

## 2018-01-01 PROCEDURE — 6370000000 HC RX 637 (ALT 250 FOR IP): Performed by: INTERNAL MEDICINE

## 2018-01-01 PROCEDURE — 82803 BLOOD GASES ANY COMBINATION: CPT

## 2018-01-01 PROCEDURE — 94003 VENT MGMT INPAT SUBQ DAY: CPT

## 2018-01-01 PROCEDURE — 2580000003 HC RX 258: Performed by: INTERNAL MEDICINE

## 2018-01-01 RX ORDER — CALCIUM GLUCONATE 94 MG/ML
INJECTION, SOLUTION INTRAVENOUS
Status: DISCONTINUED
Start: 2018-01-01 | End: 2018-01-01 | Stop reason: HOSPADM

## 2018-01-01 RX ORDER — SODIUM POLYSTYRENE SULFONATE 15 G/60ML
30 SUSPENSION ORAL; RECTAL ONCE
Status: DISCONTINUED | OUTPATIENT
Start: 2018-01-01 | End: 2018-01-01

## 2018-01-01 RX ORDER — HEPARIN SODIUM 10000 [USP'U]/100ML
18 INJECTION, SOLUTION INTRAVENOUS CONTINUOUS
Status: DISCONTINUED | OUTPATIENT
Start: 2018-01-01 | End: 2018-01-01 | Stop reason: HOSPADM

## 2018-01-01 RX ORDER — HEPARIN SODIUM 1000 [USP'U]/ML
80 INJECTION, SOLUTION INTRAVENOUS; SUBCUTANEOUS PRN
Status: DISCONTINUED | OUTPATIENT
Start: 2018-01-01 | End: 2018-01-01 | Stop reason: HOSPADM

## 2018-01-01 RX ORDER — ALBUMIN (HUMAN) 12.5 G/50ML
50 SOLUTION INTRAVENOUS ONCE
Status: DISCONTINUED | OUTPATIENT
Start: 2018-01-01 | End: 2018-01-01 | Stop reason: CLARIF

## 2018-01-01 RX ORDER — ALBUMIN (HUMAN) 12.5 G/50ML
25 SOLUTION INTRAVENOUS ONCE
Status: DISCONTINUED | OUTPATIENT
Start: 2018-01-01 | End: 2018-01-01 | Stop reason: HOSPADM

## 2018-01-01 RX ORDER — SODIUM CHLORIDE 9 MG/ML
INJECTION, SOLUTION INTRAVENOUS CONTINUOUS
Status: DISCONTINUED | OUTPATIENT
Start: 2018-01-01 | End: 2018-01-01 | Stop reason: HOSPADM

## 2018-01-01 RX ORDER — HEPARIN SODIUM 1000 [USP'U]/ML
80 INJECTION, SOLUTION INTRAVENOUS; SUBCUTANEOUS ONCE
Status: DISCONTINUED | OUTPATIENT
Start: 2018-01-01 | End: 2018-01-01 | Stop reason: HOSPADM

## 2018-01-01 RX ORDER — CALCIUM CHLORIDE 100 MG/ML
INJECTION INTRAVENOUS; INTRAVENTRICULAR
Status: DISCONTINUED
Start: 2018-01-01 | End: 2018-01-01 | Stop reason: WASHOUT

## 2018-01-01 RX ORDER — HEPARIN SODIUM 1000 [USP'U]/ML
40 INJECTION, SOLUTION INTRAVENOUS; SUBCUTANEOUS PRN
Status: DISCONTINUED | OUTPATIENT
Start: 2018-01-01 | End: 2018-01-01 | Stop reason: HOSPADM

## 2018-01-01 RX ADMIN — EPINEPHRINE 1 MG: 0.1 INJECTION, SOLUTION ENDOTRACHEAL; INTRACARDIAC; INTRAVENOUS at 10:55

## 2018-01-01 RX ADMIN — Medication 150 MEQ: at 10:47

## 2018-01-01 RX ADMIN — EPINEPHRINE 10 MCG/MIN: 1 INJECTION PARENTERAL at 10:29

## 2018-01-01 RX ADMIN — Medication 25 MCG/MIN: at 07:04

## 2018-01-01 RX ADMIN — DEXTROSE AND SODIUM CHLORIDE: 5; 900 INJECTION, SOLUTION INTRAVENOUS at 09:15

## 2018-01-01 RX ADMIN — EPINEPHRINE 1 MG: 0.1 INJECTION, SOLUTION ENDOTRACHEAL; INTRACARDIAC; INTRAVENOUS at 10:52

## 2018-01-01 RX ADMIN — Medication 0.5 UNITS/HR: at 00:54

## 2018-01-01 RX ADMIN — Medication 50 MEQ: at 10:57

## 2018-01-01 RX ADMIN — Medication 50 MEQ: at 10:55

## 2018-01-01 RX ADMIN — HYDROCORTISONE SODIUM SUCCINATE 100 MG: 100 INJECTION, POWDER, FOR SOLUTION INTRAMUSCULAR; INTRAVENOUS at 01:15

## 2018-01-01 RX ADMIN — Medication 50 MEQ: at 10:27

## 2018-01-01 RX ADMIN — VANCOMYCIN HYDROCHLORIDE 1250 MG: 5 INJECTION, POWDER, LYOPHILIZED, FOR SOLUTION INTRAVENOUS at 02:40

## 2018-01-01 RX ADMIN — Medication 75 MCG/HR: at 00:53

## 2018-01-01 RX ADMIN — EPINEPHRINE 1 MG: 0.1 INJECTION, SOLUTION ENDOTRACHEAL; INTRACARDIAC; INTRAVENOUS at 10:26

## 2018-01-01 RX ADMIN — Medication 50 MEQ: at 10:51

## 2018-01-01 RX ADMIN — EPINEPHRINE 1 MG: 0.1 INJECTION, SOLUTION ENDOTRACHEAL; INTRACARDIAC; INTRAVENOUS at 10:58

## 2018-01-01 RX ADMIN — Medication 50 MEQ: at 10:56

## 2018-01-01 RX ADMIN — SODIUM BICARBONATE: 84 INJECTION, SOLUTION INTRAVENOUS at 03:19

## 2018-01-01 RX ADMIN — Medication 50 MEQ: at 10:28

## 2018-01-01 RX ADMIN — Medication 50 MEQ: at 10:29

## 2018-01-01 RX ADMIN — Medication 50 MEQ: at 10:54

## 2018-01-01 ASSESSMENT — PULMONARY FUNCTION TESTS
PIF_VALUE: 51
PIF_VALUE: 16
PIF_VALUE: 21
PIF_VALUE: 19
PIF_VALUE: 18
PIF_VALUE: 20

## 2018-01-01 ASSESSMENT — PAIN SCALES - GENERAL
PAINLEVEL_OUTOF10: 0
PAINLEVEL_OUTOF10: 0

## 2018-01-01 NOTE — PROGRESS NOTES
0800  Temporary dialysis catheter placed to right IJ by Dr. Nancy Otto. 0900 Dr. Jadine Duverney here. Orders received for CVVHD. Wife called for update and phone consent for dialysis. 1005 CVVH initiated at this time. 1025  HR dropping to 50s. BP 64/37. Pulseless. Code called. See Code narrator. 1050  Dr. Wendie Blandon here. 200  Wife at bedside. Dr. Nancy Otto updating her on code events. 1125  HR 80s, BP 72/48. Wife at bedside. Comfort given. Explained to wife that HR and BP dropping and poor prognosis. Wife states she doesn't want any more treatment done. States \"he wouldn't want all this stuff on him\". Wife states she wants everything removed. Dr. Nancy Otto notified of wife's wishes. 1135  Medicated with fentanyl and stopped all drips. RT at bedside and extubated pt. NG tube removed as well. Oral care done. 1140  Wife at bedside along with friend. Emotional support given. 1145  Absent of heart beat. No spontaneous respirations.

## 2018-01-01 NOTE — PROGRESS NOTES
3656 Dr. Marcos oJhnson at bedside placing Dialysis catheter. 18 Dr. Sandra Betancur perfect serve messaged for STAT ECHO order. [id number] DR. Ny paged to update on Code. No new orders. 2110 Dr. Chalo Phelps returned page. Informed him of recent potassium of 6.0 Received orders to give 2 amps of bicarb IVP now and CVVH orders.

## 2018-01-01 NOTE — PROGRESS NOTES
6. 4*  6.1*   --   5.7*  6.0*   CL  95*   < >   --    < >  97*   < >  94*  92*   --    --   90*   CO2  13*   < >   --    < >  18*   < >  18*  20*   --    --   20*   BUN  37*   < >   --    < >  40*   < >  46*  47*   --    --   48*   CREATININE  2.6*   < >   --    < >  3.1*   < >  3.7*  3.9*   --    --   4.1*   MG  2.7*   < >   --    < >  2.6*   < >  2.7*  2.6*   --    --   2.7*   PHOS  4.2   < >   --    < >  4.8*   < >  4.8*  5.2*   --    --   6.2*   CALCIUM  7.5*   < >   --    < >  7.2*   < >  7.1*  6.9*   --    --   7.8*    < > = values in this interval not displayed. No results for input(s): PTT, INR in the last 72 hours. Invalid input(s): PT  Recent Labs      12/30/17   0410  12/30/17   1130   12/31/17   0416  12/31/17   2330  01/01/18   0548   AST  76*  90*   --    --    --    --    ALT  37  43   --    --    --    --    BILITOT  0.3  0.3   --    --    --    --    LACTA  8.0*  10.8*   < >  5.3*  4.8*  5.2*    < > = values in this interval not displayed.      Recent Labs      12/30/17   0050  12/30/17   0410  01/01/18   0621   TROPONINT  0.056*  0.070*  0.138*     RADIOLOGY         Electronically signed by Ken Ivy DO on 1/1/2018 at 10:51 AM

## 2018-01-01 NOTE — PLAN OF CARE
Problem: Restraint Use - Nonviolent/Non-Self-Destructive Behavior:  Goal: Absence of restraint indications  Absence of restraint indications   Outcome: Ongoing  For pt safety. Goal: Absence of restraint-related injury  Absence of restraint-related injury   Outcome: Ongoing  No evidence of injury at this time. Comments: Care plan reviewed with family. Family verbalize understanding of the plan of care and contribute to goal setting.

## 2018-01-01 NOTE — CONSULTS
bypass graft, left below-knee  amputation as well as what is reported in his medical chart as prostate  cancer, the specifics of which are unclear to me. PAST SURGICAL HISTORY:  Significant for coronary artery bypass graft,  sigmoid resection with diverting colostomy, previous hernia repair. FAMILY HISTORY:  Significant for heart disease, diabetes, and hypertension. SOCIAL HISTORY:  The patient per medical chart has no alcohol, smoking or  illicit drug abuse history. HOME MEDICATIONS:  Include doxycycline, Lipitor Toprol, Neurontin, Celexa,  metformin, lisinopril, Plavix, Flomax, Lantus, Norvasc, aspirin. REVIEW OF SYSTEMS:  Cannot be obtained at this time. CURRENT MEDICATIONS:  Include IV Levophed, normal saline, D5 with 125 mEq  of sodium bicarb, IV albumin, IV famotidine, IV vancomycin 1.2 gm every 36  hours, meropenem, heparin, fluconazole, hydrocortisone. ALLERGIES:  The patient does not have any known allergies per medical  chart. PHYSICAL EXAMINATION:  VITAL SIGNS:  Currently, blood pressure is 107/51 with a heart rate of 98,  temperature is 99.9, respiratory rate is 18, pulse oximetry is 99%. GENERAL:  On exam, he is an ill-appearing elderly 20-year-old male, who is  intubated and currently on pressor support. HEENT:  Limited examination. He has NG tube in place with suction. He has  an endotracheal tube in place. NECK:  He has a right neck vascular catheter in place. He has some blood  oozing from the site of the catheter. LUNGS:  Air entry without any expiratory wheeze or any rhonchi. HEART:  S1, S2.  ABDOMEN:  He has ostomy in place, somewhat distended. EXTREMITIES:  He has some trace to 1+ lower extremity edema on the right  leg. He has a left below-knee amputation. SKIN:  Did not reveal any maculopapular rash. NEUROLOGICAL:  Cannot be done at this time. DIAGNOSTICS:  White count is 19.6, hemoglobin of 8.6, platelet count of  367.   Sodium of 129, potassium of 6.0,

## 2018-01-01 NOTE — FLOWSHEET NOTE
6026: Code blue called, patient asystole, no pulse palpable  0607: 1mg epi given, Pulse check, PEA, compressions continued  0608: 2 amps bicarb given   0609: Pulse check, PEA, compressions continued  0609: 1 gram calcium chloride given   0610: I amp D50 given  0611: 10 units insulin started, ordered to give over 5 minutes per Dr. Maia Marley, 1 mg Epi given  0612:  ROSC achieved, patient remains in a-fib rate 130s, /110 , patient continues to be bagged by RT  0615: insulin finished   0618  /93 .  0620: oxygen saturations in the 80s, TV increased to 600 Peep increased to 10. sats remains in 60s.   0626: Levo increased to 25 mcg/min per verbal order from Dr. Maia Marley, SpO2 81%  0628 SPo2 62%  123/27  0630 Sodium Bicab with 125 mEq rate increased to 150ml/hr per Dr. Emile West request.   0633:x ray at bedside, ET tube advanced to 25 at the lip, per Dr. Maia Marley, Ox sats improved with advancement. SpO2 100%, clear bilateral breath sounds  0637 EKG bedside shows Afib with RVR    7853 Spoke with Dr. Casper Thacker updated him on patient code from [id number] with ROSC at 0612. Informed him patient received x2 of Epi, x2 amps of Bicarb, CaCl, D50 and 10 Units of insulin. Informed him of ET tube being advanced to 25cm at the lip and had in improvement in ETT cuff leak. Informed him of patients potassium level at 0400, ABG's , vent changes per Dr. Maia Marley. Stated he wanted all the original Vent settings prior to Code. Ordered to consult nephrology and have equipment at bedside for him to place a dialysis catheter. Dr. Casper Thacker ordered a STAT Echo with viewing of IVC.     1853: Spoke with Patient's wife, informed her of previous events. Spoke with here about Dr. Casper Thacker to place Dialysis catheter of which she gave verbal consent for. Pts wife states she will be to the Unit sometime this morning. Pts wife stated \"yes, please continue to do everything\"     Jorge Mac returned page. Would like a return call with potassium result.

## 2018-01-03 LAB — VRE CULTURE: NORMAL

## 2018-01-05 LAB — BLOOD CULTURE, ROUTINE: NORMAL

## (undated) DEVICE — COVER ARMBRD W13XL28.5IN IMPERV BLU FOR OP RM

## (undated) DEVICE — SOLUTION IV IRRIG POUR BRL 0.9% SODIUM CHL 2F7124

## (undated) DEVICE — 500ML,PRESSURE INFUSER W/STOPCOCK: Brand: MEDLINE

## (undated) DEVICE — 2-PIECE OSTOMY SKIN BARRIER, FLEXWEAR: Brand: NEW IMAGE

## (undated) DEVICE — Z DUPLICATE USE 2716240 STAPLER INT CUT LN L40MM STPL L51MM GRN CRV HD B FRM

## (undated) DEVICE — POOLE SUCTION HANDLE: Brand: CARDINAL HEALTH

## (undated) DEVICE — SYRINGE MED 10ML LUERLOCK TIP W/O SFTY DISP

## (undated) DEVICE — PRESSURE MONITORING SET: Brand: TRUWAVE

## (undated) DEVICE — CATHETER KIT MULTI-MED 20 CM 3 LT CV MULTI-MED

## (undated) DEVICE — 2-PIECE DRAINABLE OSTOMY POUCH: Brand: NEW IMAGE

## (undated) DEVICE — SOLUTION IV 500ML 0.9% SOD CHL PH 5 INJ USP VIAFLX PLAS

## (undated) DEVICE — Z DISCONTINUED APPLICATOR SURG PREP 0.5OZ 2% CHG 70% ISO ALC WET FOR UNIV

## (undated) DEVICE — CHLORAPREP 26ML ORANGE

## (undated) DEVICE — Device

## (undated) DEVICE — SUTURE VCRL SZ 0 L27IN ABSRB UD L36MM CT-1 1/2 CIR J260H

## (undated) DEVICE — DRESSING GRMCDL 6 12FR D1N CNTR HOLE 4MM ANTMCRBL PRTCTVE DI

## (undated) DEVICE — ULTRACLEAN ACCESSORY ELECTRODE 6" (15.24 CM) COATED BLADE: Brand: ULTRACLEAN

## (undated) DEVICE — BLANKET THER PED W24XL30IN FAB COVERING FOR HTP 1500 HEAT

## (undated) DEVICE — STAPLER INT L75MM CUT LN L73MM STPL LN L77MM BLU B FRM 8

## (undated) DEVICE — GLOVE ORANGE PI 7   MSG9070

## (undated) DEVICE — SPLINT ARMBRD W3XL10.5IN POLYFOAM DLX A LN

## (undated) DEVICE — 3M™ STERI-STRIP™ COMPOUND BENZOIN TINCTURE 40 BAGS/CARTON 4 CARTONS/CASE C1544: Brand: 3M™ STERI-STRIP™

## (undated) DEVICE — MEDI-VAC YANKAUER SUCTION HANDLE W/BULBOUS TIP: Brand: CARDINAL HEALTH

## (undated) DEVICE — HYPODERMIC SAFETY NEEDLE: Brand: MAGELLAN

## (undated) DEVICE — LARGE VISCERA RETAINER: Brand: VISCERA RETAINER, FISH

## (undated) DEVICE — SUTURE PERMA-HAND SZ 3-0 L30IN NONABSORBABLE BLK L60MM KS 622H

## (undated) DEVICE — GOWN,SIRUS,NON REINFRCD,LARGE,SET IN SL: Brand: MEDLINE

## (undated) DEVICE — SUTURE VCRL SZ 3-0 L27IN ABSRB UD L26MM SH 1/2 CIR J416H

## (undated) DEVICE — Z INACTIVE USE 2660664 SOLUTION IRRIG 3000ML 0.9% SOD CHL USP UROMATIC PLAS CONT

## (undated) DEVICE — DRAIN CHN 19FR L0.25IN DIA6.3MM SIL RND HUBLESS FULL FLUT

## (undated) DEVICE — SPONGE LAP W18XL18IN WHT COT 4 PLY FLD STRUNG RADPQ DISP ST

## (undated) DEVICE — SPONGE GZ W4XL4IN COT 12 PLY TYP VII WVN C FLD DSGN

## (undated) DEVICE — 2-PIECE OSTOMY SKIN BARRIER, CERAPLUS: Brand: NEW IMAGE

## (undated) DEVICE — TRAY PREP DRY W/ PREM GLV 2 APPL 6 SPNG 2 UNDPD 1 OVERWRAP

## (undated) DEVICE — 1-PIECE DRAINABLE OSTOMY POUCH, CERAPLUS: Brand: PREMIER

## (undated) DEVICE — TUBING PRSS 36 M F

## (undated) DEVICE — TRAY CATH 16FR F INCLUDE LUB DRNGE BG STATLOK STBL DEV

## (undated) DEVICE — OSTOMY POUCH CLAMP: Brand: HOLLISTER

## (undated) DEVICE — GLOVE ORANGE PI 8   MSG9080

## (undated) DEVICE — BANDAGE,GAUZE,4.5"X4.1YD,STERILE,LF: Brand: MEDLINE

## (undated) DEVICE — SET CATH 20GA L1.75IN RAD ART POLYUR RADPQ W/ INTEGR